# Patient Record
Sex: MALE | Employment: FULL TIME | URBAN - METROPOLITAN AREA
[De-identification: names, ages, dates, MRNs, and addresses within clinical notes are randomized per-mention and may not be internally consistent; named-entity substitution may affect disease eponyms.]

---

## 2024-09-25 ENCOUNTER — OFFICE VISIT (OUTPATIENT)
Dept: CARDIOLOGY | Facility: CLINIC | Age: 59
End: 2024-09-25
Payer: COMMERCIAL

## 2024-09-25 VITALS
HEIGHT: 70 IN | DIASTOLIC BLOOD PRESSURE: 92 MMHG | WEIGHT: 309 LBS | BODY MASS INDEX: 44.24 KG/M2 | HEART RATE: 97 BPM | SYSTOLIC BLOOD PRESSURE: 142 MMHG

## 2024-09-25 DIAGNOSIS — R06.02 SHORTNESS OF BREATH: ICD-10-CM

## 2024-09-25 DIAGNOSIS — I51.7 MILD CONCENTRIC LEFT VENTRICULAR HYPERTROPHY (LVH): ICD-10-CM

## 2024-09-25 DIAGNOSIS — Z76.89 ENCOUNTER TO ESTABLISH CARE WITH NEW DOCTOR: ICD-10-CM

## 2024-09-25 DIAGNOSIS — Z13.29 SCREENING FOR THYROID DISORDER: ICD-10-CM

## 2024-09-25 DIAGNOSIS — Z78.9 NEVER SMOKED TOBACCO: ICD-10-CM

## 2024-09-25 DIAGNOSIS — I10 BENIGN ESSENTIAL HTN: ICD-10-CM

## 2024-09-25 DIAGNOSIS — R60.0 BILATERAL LEG EDEMA: ICD-10-CM

## 2024-09-25 PROCEDURE — 1036F TOBACCO NON-USER: CPT | Performed by: INTERNAL MEDICINE

## 2024-09-25 PROCEDURE — 3080F DIAST BP >= 90 MM HG: CPT | Performed by: INTERNAL MEDICINE

## 2024-09-25 PROCEDURE — 3008F BODY MASS INDEX DOCD: CPT | Performed by: INTERNAL MEDICINE

## 2024-09-25 PROCEDURE — 3077F SYST BP >= 140 MM HG: CPT | Performed by: INTERNAL MEDICINE

## 2024-09-25 PROCEDURE — 99204 OFFICE O/P NEW MOD 45 MIN: CPT | Performed by: INTERNAL MEDICINE

## 2024-09-25 PROCEDURE — 93000 ELECTROCARDIOGRAM COMPLETE: CPT | Performed by: INTERNAL MEDICINE

## 2024-09-25 RX ORDER — FUROSEMIDE 20 MG/1
20 TABLET ORAL DAILY
COMMUNITY
End: 2024-09-25 | Stop reason: SDUPTHER

## 2024-09-25 RX ORDER — MONTELUKAST SODIUM 10 MG/1
10 TABLET ORAL NIGHTLY
COMMUNITY

## 2024-09-25 RX ORDER — VALSARTAN 40 MG/1
40 TABLET ORAL DAILY
COMMUNITY

## 2024-09-25 RX ORDER — METOPROLOL SUCCINATE 25 MG/1
12.5 TABLET, EXTENDED RELEASE ORAL DAILY
Qty: 45 TABLET | Refills: 3 | Status: SHIPPED | OUTPATIENT
Start: 2024-09-25 | End: 2025-09-25

## 2024-09-25 RX ORDER — FLUTICASONE PROPIONATE AND SALMETEROL XINAFOATE 45; 21 UG/1; UG/1
2 AEROSOL, METERED RESPIRATORY (INHALATION)
COMMUNITY

## 2024-09-25 RX ORDER — FUROSEMIDE 20 MG/1
TABLET ORAL
Qty: 135 TABLET | Refills: 3 | Status: SHIPPED | OUTPATIENT
Start: 2024-09-25

## 2024-09-25 ASSESSMENT — ENCOUNTER SYMPTOMS
RESPIRATORY NEGATIVE: 1
CONSTITUTIONAL NEGATIVE: 1
NEUROLOGICAL NEGATIVE: 1
CARDIOVASCULAR NEGATIVE: 1

## 2024-09-25 NOTE — PATIENT INSTRUCTIONS
CT Cor. Nicholas. Score  BMP,BNP, TSH  Start metoprolol S. 12.5 mg a day  Increase furosemide 20mg one day alternating with 40 mg the next  1-2 month visit    Patient educated on proper medication use.   Patient educated on risk factor modification.   Please bring any lab results from other providers / physicians to your next appointment.     Please bring all medicines, vitamins, and herbal supplements with you when you come to the office.     Prescriptions will not be filled unless you are compliant with your follow up appointments or have a follow up appointment scheduled as per instruction of your physician. Refills should be requested at the time of your visit.

## 2024-09-25 NOTE — PROGRESS NOTES
CARDIOLOGY OFFICE VISIT      CHIEF COMPLAINT  Chief Complaint   Patient presents with    New Patient Visit     Bilateral leg edema ,echo findings        HISTORY OF PRESENT ILLNESS  Laureano Elizabeth is a 59 y.o. year old male patient With morbid obesity and newly diagnosed hypertension was referred by primary care physician for lower extremity swelling.  [place] he [place] a lot of walking and he does not have chest pain or significant shortness of breath with his day-to-day activities.  However he has noticed progressive leg swelling and was seen by his primary care physician noted to be hypertensive and started on valsartan as well as hydrochlorothiazide which given some cramps this was switched to oral Lasix.  He has no prior cardiac history and there is no family history of premature coronary artery disease or sudden cardiac death.  He does not smoke and he is not exposed to secondhand smoking and he does not drink alcohol and he denies any recreational drug use.  He said he has been trying to lose weight and so far has lost about 30 pounds.  He had an echocardiogram on 9/17/2024 that shows normal size left ventricular cavity with normal LV function with EF of 64%.  There is mild left atrial dilatation and mild concentric LVH.  There are no gross valvular abnormalities.  EKG today shows normal sinus rhythm with borderline tachycardia and heart rate of 97 bpm.  Recent lipid profile showed total cholesterol is 152, HDL is 33, LDL is 102 and triglyceride is 86    ASSESSMENT AND PLAN:  1.  Benign essential hypertension: Blood pressure is suboptimally controlled patient is on low-dose valsartan as noted above which we will continue.  I will add low-dose Toprol-XL 12.5 mg daily in view of his resting tachycardia as well as to optimize his blood pressure control.  We will get a baseline BNP as well as a TSH.  2.  Lower extremity swelling: This is likely multifactorial with significant morbid  "obesity and possible diastolic dysfunction.  We have increased his Lasix to 40 mg to alternate with 20 mg every other day and we will repeat his BMP prior to his next follow-up.  3.  Morbid obesity: Patient is encouraged to lose weight and consider possible bariatric surgery if he is not successful.  Will get thyroid profile for further evaluation.    Problem List Items Addressed This Visit       Benign essential HTN    BMI 40.0-44.9, adult (Multi)    Never smoked tobacco    Bilateral leg edema    Mild concentric left ventricular hypertrophy (LVH)     Other Visit Diagnoses       Encounter to establish care with new doctor                Recent Cardiovascular Testing:    Echo-  Stress-  Cath-  Carotid Ultrasound-    Past Medical History  Past Medical History:   Diagnosis Date    HTN (hypertension)        Social History  Social History     Tobacco Use    Smoking status: Never    Smokeless tobacco: Never   Substance Use Topics    Alcohol use: Not Currently    Drug use: Never       Family History   No family history on file.     Allergies:  No Known Allergies     Outpatient Medications:  Current Outpatient Medications   Medication Instructions    fluticasone propion-salmeteroL (Advair HFA) 45-21 mcg/actuation inhaler 2 puffs, inhalation, 2 times daily RT, Rinse mouth with water after use to reduce aftertaste and incidence of candidiasis. Do not swallow.    furosemide (LASIX) 20 mg, oral, Daily    montelukast (SINGULAIR) 10 mg, oral, Nightly    valsartan (DIOVAN) 40 mg, oral, Daily        Recent Lab Results:    CBC:   No results found for: \"WBC\", \"RBC\", \"HGB\", \"HCT\", \"PLT\"     CMP:    No results found for: \"NA\", \"K\", \"CL\", \"CO2\", \"BUN\", \"CREATININE\", \"AGRATIO\", \"GLUCOSE\", \"GLU\", \"CALCIUM\"    Magnesium:    No results found for: \"MG\"    Lipid Profile:    No results found for: \"CHLPL\", \"TRIG\", \"HDL\", \"LDLCALC\", \"LDLDIRECT\"    TSH:    No results found for: \"TSH\"    BNP:   No results found for: \"BNP\"     PT/INR:    No results " "found for: \"PROTIME\", \"INR\"    HgBA1c:    Lab Results   Component Value Date    HGBA1C 5.8 (H) 07/26/2024       BMP:  No results found for: \"NA\", \"K\", \"CL\", \"CO2\", \"BUN\", \"CREATININE\", \"GLU\"    CBC:  No results found for: \"WBC\", \"RBC\", \"HGB\", \"HCT\", \"MCV\", \"MCH\", \"MCHC\", \"RDW\", \"PLT\", \"MPV\"    Cardiac Enzymes:    No results found for: \"TROPHS\"    Hepatic Function Panel:    No results found for: \"ALKPHOS\", \"ALT\", \"AST\", \"PROT\", \"BILITOT\", \"BILIDIR\"      REVIEW OF SYSTEMS  Review of Systems   Constitutional: Negative.   Cardiovascular: Negative.    Respiratory: Negative.     Neurological: Negative.    All other systems reviewed and are negative.      VITALS  Vitals:    09/25/24 0902   BP: 144/90   Pulse: 97     Wt Readings from Last 4 Encounters:   09/25/24 140 kg (309 lb)       PHYSICAL EXAM  Constitutional:       Appearance: Healthy appearance.   Eyes:      Pupils: Pupils are equal, round, and reactive to light.   Pulmonary:      Effort: Pulmonary effort is normal.      Breath sounds: Normal breath sounds.   Cardiovascular:      PMI at left midclavicular line. Normal rate. Regular rhythm.      Murmurs: There is no murmur.      No gallop.  No click. No rub.   Pulses:     Intact distal pulses.   Edema:     Comments: 2 + bilateral leg edema  Musculoskeletal: Normal range of motion.      Cervical back: Normal range of motion. Skin:     General: Skin is warm and dry.   Neurological:      General: No focal deficit present.      Mental Status: Alert and oriented to person, place and time.             "

## 2024-10-22 ENCOUNTER — APPOINTMENT (OUTPATIENT)
Dept: CARDIOLOGY | Facility: CLINIC | Age: 59
End: 2024-10-22
Payer: COMMERCIAL

## 2024-11-07 ENCOUNTER — APPOINTMENT (OUTPATIENT)
Dept: RADIOLOGY | Facility: CLINIC | Age: 59
End: 2024-11-07
Payer: COMMERCIAL

## 2024-11-20 ENCOUNTER — APPOINTMENT (OUTPATIENT)
Dept: CARDIOLOGY | Facility: CLINIC | Age: 59
End: 2024-11-20
Payer: COMMERCIAL

## 2025-04-01 ENCOUNTER — OFFICE VISIT (OUTPATIENT)
Dept: ORTHOPEDIC SURGERY | Facility: CLINIC | Age: 60
End: 2025-04-01
Payer: COMMERCIAL

## 2025-04-01 ENCOUNTER — HOSPITAL ENCOUNTER (OUTPATIENT)
Dept: RADIOLOGY | Facility: CLINIC | Age: 60
Discharge: HOME | End: 2025-04-01
Payer: COMMERCIAL

## 2025-04-01 ENCOUNTER — PREP FOR PROCEDURE (OUTPATIENT)
Dept: ORTHOPEDIC SURGERY | Facility: CLINIC | Age: 60
End: 2025-04-01

## 2025-04-01 VITALS — BODY MASS INDEX: 36.4 KG/M2 | HEIGHT: 71 IN | WEIGHT: 260 LBS

## 2025-04-01 DIAGNOSIS — M25.562 ACUTE PAIN OF LEFT KNEE: ICD-10-CM

## 2025-04-01 PROBLEM — M17.12 UNILATERAL PRIMARY OSTEOARTHRITIS, LEFT KNEE: Status: ACTIVE | Noted: 2025-04-01

## 2025-04-01 PROCEDURE — 3008F BODY MASS INDEX DOCD: CPT | Performed by: ORTHOPAEDIC SURGERY

## 2025-04-01 PROCEDURE — 73560 X-RAY EXAM OF KNEE 1 OR 2: CPT | Mod: RT

## 2025-04-01 PROCEDURE — 73564 X-RAY EXAM KNEE 4 OR MORE: CPT | Mod: RIGHT SIDE | Performed by: RADIOLOGY

## 2025-04-01 PROCEDURE — 99214 OFFICE O/P EST MOD 30 MIN: CPT | Mod: 57 | Performed by: ORTHOPAEDIC SURGERY

## 2025-04-01 PROCEDURE — 99204 OFFICE O/P NEW MOD 45 MIN: CPT | Performed by: ORTHOPAEDIC SURGERY

## 2025-04-01 PROCEDURE — 73564 X-RAY EXAM KNEE 4 OR MORE: CPT | Mod: LT

## 2025-04-01 PROCEDURE — 73560 X-RAY EXAM OF KNEE 1 OR 2: CPT | Mod: RIGHT SIDE | Performed by: RADIOLOGY

## 2025-04-02 DIAGNOSIS — M17.12 UNILATERAL PRIMARY OSTEOARTHRITIS, LEFT KNEE: ICD-10-CM

## 2025-05-01 ASSESSMENT — DUKE ACTIVITY SCORE INDEX (DASI)
TOTAL_SCORE: 36.7
CAN YOU DO MODERATE WORK AROUND THE HOUSE LIKE VACUUMING, SWEEPING FLOORS OR CARRYING GROCERIES: YES
CAN YOU DO YARD WORK LIKE RAKING LEAVES, WEEDING OR PUSHING A MOWER: YES
CAN YOU DO HEAVY WORK AROUND THE HOUSE LIKE SCRUBBING FLOORS OR LIFTING AND MOVING HEAVY FURNITURE: YES
CAN YOU TAKE CARE OF YOURSELF (EAT, DRESS, BATHE, OR USE TOILET): YES
CAN YOU CLIMB A FLIGHT OF STAIRS OR WALK UP A HILL: YES
CAN YOU RUN A SHORT DISTANCE: NO
CAN YOU WALK A BLOCK OR TWO ON LEVEL GROUND: YES
CAN YOU HAVE SEXUAL RELATIONS: YES
CAN YOU PARTICIPATE IN STRENOUS SPORTS LIKE SWIMMING, SINGLES TENNIS, FOOTBALL, BASKETBALL, OR SKIING: NO
CAN YOU PARTICIPATE IN MODERATE RECREATIONAL ACTIVITIES LIKE GOLF, BOWLING, DANCING, DOUBLES TENNIS OR THROWING A BASEBALL OR FOOTBALL: NO
CAN YOU WALK INDOORS, SUCH AS AROUND YOUR HOUSE: YES
DASI METS SCORE: 7.3
CAN YOU DO LIGHT WORK AROUND THE HOUSE LIKE DUSTING OR WASHING DISHES: YES

## 2025-05-01 ASSESSMENT — ACTIVITIES OF DAILY LIVING (ADL): ADL_SCORE: 1

## 2025-05-01 ASSESSMENT — LIFESTYLE VARIABLES: SMOKING_STATUS: NONSMOKER

## 2025-05-02 ENCOUNTER — LAB (OUTPATIENT)
Dept: LAB | Facility: HOSPITAL | Age: 60
End: 2025-05-02
Payer: COMMERCIAL

## 2025-05-02 ENCOUNTER — PRE-ADMISSION TESTING (OUTPATIENT)
Dept: PREADMISSION TESTING | Facility: HOSPITAL | Age: 60
End: 2025-05-02
Payer: COMMERCIAL

## 2025-05-02 ENCOUNTER — HOSPITAL ENCOUNTER (OUTPATIENT)
Dept: RADIOLOGY | Facility: HOSPITAL | Age: 60
Discharge: HOME | End: 2025-05-02
Payer: COMMERCIAL

## 2025-05-02 VITALS
TEMPERATURE: 96.8 F | RESPIRATION RATE: 16 BRPM | DIASTOLIC BLOOD PRESSURE: 78 MMHG | HEART RATE: 92 BPM | HEIGHT: 70 IN | WEIGHT: 270.06 LBS | OXYGEN SATURATION: 97 % | SYSTOLIC BLOOD PRESSURE: 120 MMHG | BODY MASS INDEX: 38.66 KG/M2

## 2025-05-02 DIAGNOSIS — Z01.818 PREOP EXAMINATION: ICD-10-CM

## 2025-05-02 DIAGNOSIS — M17.12 UNILATERAL PRIMARY OSTEOARTHRITIS, LEFT KNEE: Primary | ICD-10-CM

## 2025-05-02 DIAGNOSIS — M25.562 ACUTE PAIN OF LEFT KNEE: ICD-10-CM

## 2025-05-02 DIAGNOSIS — M17.12 UNILATERAL PRIMARY OSTEOARTHRITIS, LEFT KNEE: ICD-10-CM

## 2025-05-02 LAB
ALBUMIN SERPL BCP-MCNC: 3.5 G/DL (ref 3.4–5)
ALP SERPL-CCNC: 54 U/L (ref 33–136)
ALT SERPL W P-5'-P-CCNC: 7 U/L (ref 10–52)
ANION GAP SERPL CALC-SCNC: 12 MMOL/L (ref 10–20)
APTT PPP: 29 SECONDS (ref 26–36)
AST SERPL W P-5'-P-CCNC: 9 U/L (ref 9–39)
BASOPHILS # BLD AUTO: 0.03 X10*3/UL (ref 0–0.1)
BASOPHILS NFR BLD AUTO: 0.4 %
BILIRUB SERPL-MCNC: 0.2 MG/DL (ref 0–1.2)
BUN SERPL-MCNC: 15 MG/DL (ref 6–23)
CALCIUM SERPL-MCNC: 9 MG/DL (ref 8.6–10.3)
CHLORIDE SERPL-SCNC: 101 MMOL/L (ref 98–107)
CO2 SERPL-SCNC: 28 MMOL/L (ref 21–32)
CREAT SERPL-MCNC: 0.88 MG/DL (ref 0.5–1.3)
EGFRCR SERPLBLD CKD-EPI 2021: >90 ML/MIN/1.73M*2
EOSINOPHIL # BLD AUTO: 0.71 X10*3/UL (ref 0–0.7)
EOSINOPHIL NFR BLD AUTO: 9 %
ERYTHROCYTE [DISTWIDTH] IN BLOOD BY AUTOMATED COUNT: 16.2 % (ref 11.5–14.5)
EST. AVERAGE GLUCOSE BLD GHB EST-MCNC: 91 MG/DL
GLUCOSE SERPL-MCNC: 91 MG/DL (ref 74–99)
HBA1C MFR BLD: 4.8 % (ref ?–5.7)
HCT VFR BLD AUTO: 35.3 % (ref 41–52)
HGB BLD-MCNC: 10.6 G/DL (ref 13.5–17.5)
HOLD SPECIMEN: 293
IMM GRANULOCYTES # BLD AUTO: 0.04 X10*3/UL (ref 0–0.7)
IMM GRANULOCYTES NFR BLD AUTO: 0.5 % (ref 0–0.9)
INR PPP: 1.2 (ref 0.9–1.1)
LYMPHOCYTES # BLD AUTO: 1.33 X10*3/UL (ref 1.2–4.8)
LYMPHOCYTES NFR BLD AUTO: 16.9 %
MCH RBC QN AUTO: 26 PG (ref 26–34)
MCHC RBC AUTO-ENTMCNC: 30 G/DL (ref 32–36)
MCV RBC AUTO: 87 FL (ref 80–100)
MONOCYTES # BLD AUTO: 0.65 X10*3/UL (ref 0.1–1)
MONOCYTES NFR BLD AUTO: 8.2 %
NEUTROPHILS # BLD AUTO: 5.12 X10*3/UL (ref 1.2–7.7)
NEUTROPHILS NFR BLD AUTO: 65 %
NRBC BLD-RTO: 0 /100 WBCS (ref 0–0)
PLATELET # BLD AUTO: 307 X10*3/UL (ref 150–450)
POTASSIUM SERPL-SCNC: 4.9 MMOL/L (ref 3.5–5.3)
PROT SERPL-MCNC: 6.9 G/DL (ref 6.4–8.2)
PROTHROMBIN TIME: 13.8 SECONDS (ref 9.8–12.4)
RBC # BLD AUTO: 4.08 X10*6/UL (ref 4.5–5.9)
SODIUM SERPL-SCNC: 136 MMOL/L (ref 136–145)
WBC # BLD AUTO: 7.9 X10*3/UL (ref 4.4–11.3)

## 2025-05-02 PROCEDURE — 85025 COMPLETE CBC W/AUTO DIFF WBC: CPT

## 2025-05-02 PROCEDURE — 73700 CT LOWER EXTREMITY W/O DYE: CPT | Mod: LT

## 2025-05-02 PROCEDURE — 83036 HEMOGLOBIN GLYCOSYLATED A1C: CPT

## 2025-05-02 PROCEDURE — 85610 PROTHROMBIN TIME: CPT

## 2025-05-02 PROCEDURE — 80053 COMPREHEN METABOLIC PANEL: CPT

## 2025-05-02 PROCEDURE — 87081 CULTURE SCREEN ONLY: CPT | Mod: STJLAB | Performed by: ORTHOPAEDIC SURGERY

## 2025-05-02 PROCEDURE — 85730 THROMBOPLASTIN TIME PARTIAL: CPT

## 2025-05-02 PROCEDURE — 99202 OFFICE O/P NEW SF 15 MIN: CPT

## 2025-05-02 RX ORDER — MELOXICAM 15 MG/1
15 TABLET ORAL DAILY
COMMUNITY

## 2025-05-02 RX ORDER — CHLORHEXIDINE GLUCONATE ORAL RINSE 1.2 MG/ML
SOLUTION DENTAL
Qty: 473 ML | Refills: 0 | Status: SHIPPED | OUTPATIENT
Start: 2025-05-02

## 2025-05-02 RX ORDER — VALSARTAN 80 MG/1
80 TABLET ORAL DAILY
COMMUNITY

## 2025-05-02 RX ORDER — NEBIVOLOL 5 MG/1
5 TABLET ORAL DAILY
COMMUNITY

## 2025-05-02 NOTE — PREPROCEDURE INSTRUCTIONS
Medication List            Accurate as of May 2, 2025 10:38 AM. Always use your most recent med list.                chlorhexidine 0.12 % solution  Commonly known as: Peridex  15 milliliter(s) orally once a day for 2 doses 15 ml  the night before surgery and 15 ml morning of surgery - swish for 30 seconds -DO NOT SWALLOW, SPIT OUT     furosemide 20 mg tablet  Commonly known as: Lasix  Take two tabs. One day alternating with one tab. The next  Medication Adjustments for Surgery: Do Not take on the morning of surgery     meloxicam 15 mg tablet  Commonly known as: Mobic  Additional Medication Adjustments for Surgery: Take last dose 7 days before surgery     metoprolol succinate XL 25 mg 24 hr tablet  Commonly known as: Toprol-XL  Take 0.5 tablets (12.5 mg) by mouth once daily. Do not crush or chew.  Additional Medication Adjustments for Surgery: Other (Comment)  Notes to patient: Not taking      montelukast 10 mg tablet  Commonly known as: Singulair  Medication Adjustments for Surgery: Take/Use as prescribed     nebivolol 5 mg tablet  Commonly known as: Bystolic  Medication Adjustments for Surgery: Take/Use as prescribed     valsartan 80 mg tablet  Commonly known as: Diovan  Additional Medication Adjustments for Surgery: Other (Comment)  Notes to patient: HOLD any evening dose the night before the day of surgery  HOLD the day of surgery                                  PRE-OPERATIVE INSTRUCTIONS    You will receive notification one business day prior to your procedure to confirm your arrival time. It is important that you answer your phone and/or check your messages during this time. If you do not hear from the surgery center by 5 pm. the day before your procedure, please call [phone].     Please enter the building through the Outpatient entrance and take the elevator off the lobby to the 2nd floor then check in at the Outpatient Surgery desk on the 2nd floor.    INSTRUCTIONS:  Talk to your surgeon for  instructions if you should stop your aspirin, blood thinner, or diabetes medicines.  DO NOT take any multivitamins or over the counter supplements for 7-10 days before surgery.  If not being admitted, you must have an adult immediately available to drive you home after surgery. We also highly recommend you have someone stay with you for the entire day and night of your surgery.  For children having surgery, a parent or legal guardian must accompany them to the surgery center. If this is not possible, please call [phone] to make additional arrangements.  For adults who are unable to consent or make medical decisions for themselves, a legal guardian or Power of  must accompany them to the surgery center. If this is not possible, please call [phone] to make additional arrangements.  Wear comfortable, loose fitting clothing.  All jewelry and piercings must be removed. If you are unable to remove an item or have a dermal piercing, please be sure to tell the nurse when you arrive for surgery.  Nail polish and make-up must be removed.  Avoid smoking or consuming alcohol for 24 hours before surgery.  To help prevent infection, please take a shower/bath and wash your hair the night before and/or morning of surgery (or follow other specific bathing instructions provided).    Preoperative Fasting Guidelines    Why must I stop eating and drinking near surgery time?  With sedation, food or liquid in your stomach can enter your lungs causing serious complications  Increases nausea and vomiting    When do I need to stop eating and drinking before my surgery?  Do not eat any solid food after midnight the night before your surgery/procedure unless otherwise instructed by your surgeon.   If you take a GLP-1 medication (ex: Trulicity, Ozempic, Mounjaro, Zepbound, Bydyreon, Tanzeun, Saxenda, Victoza, Adlyxin, Rybelus) please follow other specific instructions provided regarding preoperative          fasting.  You may  have up to 13.5 ounces of clear liquid until TWO hours before your instructed arrival time to the hospital.  This includes water, black tea/coffee, (no milk or cream) apple juice, and electrolyte drinks (Gatorade).   You may chew gum until TWO hours before your surgery/procedure         If applicable, notify your surgeons office immediately of any new skin changes that occur to the surgical limb.      If you have any questions or concerns, please call Pre-Admission Testing at [phone].

## 2025-05-02 NOTE — CPM/PAT H&P
CPM/PAT Evaluation       Name: Laureano Elizabeth (Laureano Min  /Age: 1965/59 y.o.     In-Person       Chief Complaint: Left knee pain     HPI  Pleasant 60 y/o male presents with unilateral primary osteoarthritis, left knee scheduled for left knee arthroplasty on 25. States he has been having pain for at least one year. Endorses limited range of motion and limited physical activity. Endorses significant swelling in lower extremities for past 6-7 months. Denies recent fever/illness/chills.    Medical History[1]    Surgical History[2]    Patient Sexual activity questions deferred to the physician.    Family History[3]    Allergies[4]    Prior to Admission medications    Medication Sig Start Date End Date Taking? Authorizing Provider   fluticasone propion-salmeteroL (Advair HFA) 45-21 mcg/actuation inhaler Inhale 2 puffs 2 times a day. Rinse mouth with water after use to reduce aftertaste and incidence of candidiasis. Do not swallow.    Historical Provider, MD   furosemide (Lasix) 20 mg tablet Take two tabs. One day alternating with one tab. The next 24   Pako Cao MD   metoprolol succinate XL (Toprol-XL) 25 mg 24 hr tablet Take 0.5 tablets (12.5 mg) by mouth once daily. Do not crush or chew. 24  Pako Cao MD   montelukast (Singulair) 10 mg tablet Take 1 tablet (10 mg) by mouth once daily at bedtime.    Historical Provider, MD   valsartan (Diovan) 40 mg tablet Take 1 tablet (40 mg) by mouth once daily.    Historical Provider, MD        Constitutional: Negative for fever, chills, or sweats   ENMT: Negative for nasal discharge, congestion, ear pain, mouth pain, throat pain. Positive for contacts/glasses. Positive for sinus congestion related to allergies.   Respiratory: Negative for cough, wheezing, shortness of breath   Cardiac: Negative for chest pain, dyspnea on exertion, palpitations   Gastrointestinal: Negative for nausea, vomiting, diarrhea, constipation, abdominal  pain  Genitourinary: Negative for dysuria, flank pain, frequency, hematuria   Musculoskeletal: Negative for decreased ROM, pain, swelling, weakness. See HPI. Positive for bilat. LE and hand swelling.   Neurological: Negative for dizziness, confusion, headache  Psychiatric: Negative for mood changes   Skin: Negative for itching, rash, ulcer    Hematologic/Lymph: Negative for bruising, easy bleeding  Allergic/Immunologic: Negative itching, sneezing, swelling      Physical Exam  Vitals reviewed.   Constitutional:       Appearance: Normal appearance. He is obese.   HENT:      Head: Normocephalic.      Mouth/Throat:      Mouth: Mucous membranes are moist.      Pharynx: Oropharynx is clear.   Eyes:      Pupils: Pupils are equal, round, and reactive to light.   Cardiovascular:      Rate and Rhythm: Normal rate and regular rhythm.      Heart sounds: Normal heart sounds.   Pulmonary:      Effort: Pulmonary effort is normal.      Breath sounds: Normal breath sounds.   Abdominal:      General: Bowel sounds are normal.      Palpations: Abdomen is soft.   Musculoskeletal:         General: Normal range of motion.      Cervical back: Normal range of motion.      Comments: Bilateral hand/wrist and bilateral lower extremity-+2 pitting edema    Skin:     General: Skin is warm and dry.   Neurological:      General: No focal deficit present.      Mental Status: He is alert and oriented to person, place, and time.   Psychiatric:         Mood and Affect: Mood normal.         Behavior: Behavior normal.          PAT AIRWAY:   Airway:     Mallampati::  IV    Neck ROM::  Limited  normal        Testing/Diagnostic:   Nuclear stress on file from 4/25/25. Stress ECG Conclusion:   Conclusion: Normal     Stress ECG Summary:   The patient's resting heart rate was 80 bpm and blood pressure was 118/67   mmHg. The patient received regadenoson 0.4 mg IVP over approximately 15   seconds followed immediately by injection of nuclear isotope. The test    was terminated due to end of protocol. Other symptoms during the test   included lightheadedness. The maximum heart rate was 93 bpm, which is 58%   of the predicted heart rate for age. Peak blood pressure was 104/64 mmHg.   The double product achieved was 9672.     PVR with exercise 8/27/2024  IMPRESSION     RIGHT SIDE      Resting right ankle brachial index: 1.31   Post exercise right ankle brachial index: 1.38      Normal ankle brachial index at rest in the right leg.      Right ankle: Normal at rest.      No significant change in pressure and/or ankle-brachial index with exercise.      LEFT SIDE      Resting left ankle brachial index: 1.26   Post exercise left ankle brachial index: 1.24      Normal ankle brachial index at rest in the left leg.      Left ankle: Normal at rest.      No significant change in pressure and/or ankle-brachial index with exercise.         Ultrasound DVT 8/26/2024  IMPRESSION   RIGHT SIDE - DEEP VEINS   Negative for acute deep vein thrombosis.      RIGHT SIDE - SUPERFICIAL VEINS   Negative for superficial thrombophlebitis in the great saphenous vein and small   saphenous vein.      LEFT SIDE - DEEP VEINS   Negative for acute deep vein thrombosis.      LEFT SIDE - SUPERFICIAL VEINS   Negative for superficial thrombophlebitis in the great saphenous vein and small   saphenous vein.     Echocardiogram 9/17/2024  CONCLUSIONS:   - Technically difficult exam due to body habitus and suboptimal positioning.   - Exam indication: Re-evaluation of Hypertensive heart disease without change in   clinical status   - The left ventricle is normal in size. There is mild left ventricular   hypertrophy. Left ventricular systolic function is normal. EF = 64 ± 5% (2D   biplane) Indeterminate left ventricular diastolic function.   - The right ventricle is normal in size. Right ventricular systolic function is   normal.   - The left atrial cavity is mildly dilated.   - There are no significant valvular  "abnormalities.   - Exam was compared with the prior  echocardiographic exam performed on   06/23/2022 ([place]). No significant change    Patient Specialist/PCP:   PCP: Dr. Mendez  Cardiology: Dr. Cerna     Visit Vitals  /78   Pulse 92   Temp 36 °C (96.8 °F) (Temporal)   Resp 16   Ht 1.778 m (5' 10\")   Wt 123 kg (270 lb 1 oz)   SpO2 97%   BMI 38.75 kg/m²   Smoking Status Never   BSA 2.46 m²       DASI Risk Score      Flowsheet Row Pre-Admission Testing from 5/2/2025 in [facility]   Can you take care of yourself (eat, dress, bathe, or use toilet)?  2.75 filed at 05/01/2025 1059   Can you walk indoors, such as around your house? 1.75 filed at 05/01/2025 1059   Can you walk a block or two on level ground?  2.75 filed at 05/01/2025 1059   Can you climb a flight of stairs or walk up a hill? 5.5 filed at 05/01/2025 1059   Can you run a short distance? 0 filed at 05/01/2025 1059   Can you do light work around the house like dusting or washing dishes? 2.7 filed at 05/01/2025 1059   Can you do moderate work around the house like vacuuming, sweeping floors or carrying groceries? 3.5 filed at 05/01/2025 1059   Can you do heavy work around the house like scrubbing floors or lifting and moving heavy furniture?  8 filed at 05/01/2025 1059   Can you do yard work like raking leaves, weeding or pushing a mower? 4.5 filed at 05/01/2025 1059   Can you have sexual relations? 5.25 filed at 05/01/2025 1059   Can you participate in moderate recreational activities like golf, bowling, dancing, doubles tennis or throwing a baseball or football? 0 filed at 05/01/2025 1059   Can you participate in strenous sports like swimming, singles tennis, football, basketball, or skiing? 0 filed at 05/01/2025 1059   DASI SCORE 36.7 filed at 05/01/2025 1059   METS Score (Will be calculated only when all the questions are answered) 7.3 filed at 05/01/2025 1059          Caprini DVT Assessment      Flowsheet Row Pre-Admission " Testing from 5/2/2025 in [facility]   DVT Score (IF A SCORE IS NOT CALCULATING, MUST SELECT A BMI TO COMPLETE) 10 filed at 05/01/2025 1100   Surgical Factors Elective major lower extremity arthroplasty filed at 05/01/2025 1100   BMI (BMI MUST BE CHOSEN) 31-40 (Obesity) filed at 05/01/2025 1100          Modified Frailty Index      Flowsheet Row Pre-Admission Testing from 5/2/2025 in [facility]   Non-independent functional status (problems with dressing, bathing, personal grooming, or cooking) 0 filed at 05/01/2025 1059   History of diabetes mellitus  0 filed at 05/01/2025 1059   History of COPD 0 filed at 05/01/2025 1059   History of CHF No filed at 05/01/2025 1059   History of MI 0 filed at 05/01/2025 1059   History of Percutaneous Coronary Intervention, Cardiac Surgery, or Angina No filed at 05/01/2025 1059   Hypertension requiring the use of medication  0.0909 filed at 05/01/2025 1059   Peripheral vascular disease 0 filed at 05/01/2025 1059   Impaired sensorium (cognitive impairement or loss, clouding, or delirium) 0 filed at 05/01/2025 1059   TIA or CVA withouy residual deficit 0 filed at 05/01/2025 1059   Cerebrovascular accident with deficit 0 filed at 05/01/2025 1059   Modified Frailty Index Calculator .0909 filed at 05/01/2025 1059          [id number] Stroke Risk Points  Current as of just now        N/A 0 to 9 Points:      Last Change: N/A          The [id number] risk score (Lip WOO, et al. 2009. © 2010 [employer]) quantifies the risk of stroke for a patient with atrial fibrillation. For patients without atrial fibrillation or under the age of 18 this score appears as N/A. Higher score values generally indicate higher risk of stroke.        This score is not applicable to this patient. Components are not calculated.          Revised Cardiac Risk Index      Flowsheet Row Pre-Admission Testing from 5/2/2025 in [facility]   High-Risk  Surgery (Intraperitoneal, Intrathoracic,Suprainguinal vascular) 0 filed at 05/01/2025 1059   History of ischemic heart disease (History of MI, History of positive exercuse test, Current chest paint considered due to myocardial ischemia, Use of nitrate therapy, ECG with pathological Q Waves) 0 filed at 05/01/2025 1059   History of congestive heart failure (pulmonary edemia, bilateral rales or S3 gallop, Paroxysmal nocturnal dyspnea, CXR showing pulmonary vascular redistribution) 0 filed at 05/01/2025 1059   History of cerebrovascular disease (Prior TIA or stroke) 0 filed at 05/01/2025 1059   Pre-operative insulin treatment 0 filed at 05/01/2025 1059   Pre-operative creatinine>2 mg/dl 0 filed at 05/01/2025 1059   Revised Cardiac Risk Calculator 0 filed at 05/01/2025 1059          Apfel Simplified Score      Flowsheet Row Pre-Admission Testing from 5/2/2025 in [facility]   Smoking status 1 filed at 05/01/2025 1059   History of motion sickness or PONV  1 filed at 05/01/2025 1059   Use of postoperative opioids 1 filed at 05/01/2025 1059   Gender - Female 0=No filed at 05/01/2025 1059   Apfel Simplified Score Calculator 3 filed at 05/01/2025 1059          Risk Analysis Index Results This Encounter         5/1/2025  1100             Do you live in a place other than your own home?: 0    When did you begin living in the place you are currently residing?: Greater than one year ago    Any kidney failure, kidney not working well, or seeing a kidney doctor (nephrologist)? If yes, was this for kidney stones or another problem?: 0 No    Any history of chronic (long-term) congestive heart failure (CHF)?: 0 No    Any shortness of breath when resting?: 0 No    In the past five years, have you been diagnosed with or treated for cancer?: No    During the last 3 months has it become difficult for you to remember things or organize your thoughts?: 0 No    Have you lost weight of 10 pounds or more in the past 3 months  without trying?: 0 No    Do you have any loss of appetitie?: 0 No    Getting Around (Mobility): 1 Needs help from cane, walker, or scooter    Eatin Can plan and prepare own meals    Toiletin Can use toilet without any help    Personal Hygiene (Bathing, Hand Washing, Changing Clothes): 0 Can shower or bathe without any help    CONSTANTINO Cancer History: Patient does not indicate history of cancer    Total Risk Analysis Index Score Without Cancer: 22    Total Risk Analysis Index Score: 22          Stop Bang Score      Flowsheet Row Pre-Admission Testing from 2025 in [facility]   Do you snore loudly? 1 filed at 2025 1058   Do you often feel tired or fatigued after your sleep? 0 filed at 2025 1058   Has anyone ever observed you stop breathing in your sleep? 0 filed at 2025 1058   Do you have or are you being treated for high blood pressure? 1 filed at 2025 1058   Recent BMI (Calculated) 36.3 filed at 2025 1058   Is BMI greater than 35 kg/m2? 1=Yes filed at 2025 1058   Age older than 50 years old? 1=Yes filed at 2025 1058   Is your neck circumference greater than 17 inches (Male) or 16 inches (Female)? 0 filed at 2025 1058   Gender - Male 1=Yes filed at 2025 1058   STOP-BANG Total Score 5 filed at 2025 1058          Prodigy: High Risk  Total Score: 16              Prodigy Age Score      Prodigy Gender Score          ARISCAT Score for Postoperative Pulmonary Complications      Flowsheet Row Pre-Admission Testing from 2025 in [facility]   Age Calculated Score 3 filed at 2025 1101   Preoperative SpO2 0 filed at 2025 1101   Respiratory infection in the last month Either upper or lower (i.e., URI, bronchitis, pneumonia), with fever and antibiotic treatment 0 filed at 2025 1101   Preoperative anemia (Hgb less than 10 g/dl) 0 filed at 2025 1101   Surgical incision  0 filed at 2025 1101   Duration of  surgery  0 filed at 05/01/2025 1101   Emergency Procedure  0 filed at 05/01/2025 1101   ARISCAT Total Score  3 filed at 05/01/2025 1101          Jayda Perioperative Risk for Myocardial Infarction or Cardiac Arrest (ADAM)      Flowsheet Row Pre-Admission Testing from 5/2/2025 in [facility]   Calculated Age Score 1.2 filed at 05/01/2025 1101   Functional Status  0 filed at 05/01/2025 1101   ASA Class  -5.17 filed at 05/01/2025 1101   Creatinine 0 filed at 05/01/2025 1101   Type of Procedure  0.80 filed at 05/01/2025 1101   ADAM Total Score  -8.42 filed at 05/01/2025 1101   ADAM % 0.02 filed at 05/01/2025 1101            Assessment and Plan:     Assessment and Plan:     Preop:   OR with Dr. Leiva on 5/16/25   Labs ordered per Dr. Leiva.   EKG on file from 4/15/25. NSR. Incomplete RBBB.     Updated Dr. Leiva of patient's continued lymphedema prior to surgery-wanted him to be aware that the patient was attempting to follow with some of the treatment options given to him by vascular and OT for lymphedema but continues to have +2 pitting edema. He has not been able to tolerate wearing the velcro pieces that OT recommended to him. He also has been off lasix for some time-unsure who prescribed and if they wanted him to continue taking.     Neurologic:   The patient is at an increased risk for post operative delirium secondary to decrease functional status, polypharmacy , and type and duration of surgery . Preoperative brain exercise educational handout provided to patient.  The patient is at an increased risk for perioperative stroke secondary to HTN and general anesthesia.    Cardiac:  Hypertension: Controlled with medical management   Bilat LE edema: States he has had this for roughly 6-7 months. Cardiology note from 4/15 states he has no LE edema-unsure why as patient states he told cardiology about edema. He did see vascular in January of 2025 and he was referred for lymphedema therapy-he states he  followed for a few visits but has not been seen in months. He was also recommended to be tested for sleep apnea which he did not do.He no longer takes lasix as he said he had significant cramping and could not tolerate lasix. He has not taken for months. No open areas noted and skin is normal in color.   Duke Activity Status Index (DASI)  DASI Score: 36.7   MET Score: 7.3  RCRI  0 which is 3.9% 30 day risk of MACE (risk for cardiac death, nonfatal myocardial infarction, and nonfactal cardiac arrest)  ADAM score which indicates a  0.02 % risk of intraoperative or 30-day postoperative MACE    Pulmonary:   Asthma: Stable. Does not use inhalers-states it is more allergies than asthma. Takes singulair daily.   STOP-BANG score of  6. High  risk of obstructive sleep apnea.   ARISCAT:   3   points which is a low (1.6%) risk of in-hospital post-op pulmonary complications     Endocrine:   Obesity: Was seen by endocrinology who recommended diet changes and exercise. He has lost 6 pounds since Feb. 2025.     GI:  Apfel: 3 points 61% risk for post operative N/V    Neuro-muscular:   Arthritis: Reason for upcoming procedure     Hematologic:   Caprini score 10, patient at high risk for perioperative DVT. Patient provided with VTE education/handout.     Skin check: Patient was instructed to make surgeon aware of any skin changes/concerns prior to surgery.     Anesthesia: No history of anesthesia complications. No anesthesia concerns.      *See risk scores as previously documented        [1]   Past Medical History:  Diagnosis Date    Asthma     HTN (hypertension)    [2]   Past Surgical History:  Procedure Laterality Date    APPENDECTOMY      in tenth grade    OTHER SURGICAL HISTORY      UPPER ENDOSCOPY TO REMOVE LODGED FOOD IN ESOPHAGUS   [3] No family history on file.  [4] No Known Allergies

## 2025-05-02 NOTE — PREPROCEDURE INSTRUCTIONS
Thank you for visiting Preadmission Testing at [facility]. If you have any changes to your health condition, please call the SURGEON's office to alert them and give them details of your symptoms.  STOP all NSAIDS (Ibuprofen, Motrin, Aleve), vitamins, herbals, supplements, and all over the counter medications for 7 days prior to surgery  Tylenol is okay to take up until the morning of surgery for pain or headache if needed         Preoperative Brain Exercises    What are brain exercises?  A brain exercise is any activity that engages your thinking (cognitive) skills.    What types of activities are considered brain exercises?  Jigsaw puzzles, crossword puzzles, word jumble, memory games, word search, and many more.  Many can be found free online or on your phone via a mobile rain.    Why should I do brain exercises before my surgery?  More recent research has shown brain exercise before surgery can lower the risk of postoperative delirium (confusion) which can be especially important for older adults.  Patients who did brain exercises for 5 to 10 hours the days before surgery, cut their risk of postoperative delirium in half up to 1 week after surgery.      Preoperative Deep Breathing Exercises    Why it is important to do deep breathing exercises before my surgery?  Deep breathing exercises strengthen your breathing muscles.  This helps you to recover after your surgery and decreases the chance of breathing complications.    How are the deep breathing exercises done?  Sit straight with your back supported.  Breathe in deeply and slowly through your nose. Your lower rib cage should expand and your abdomen may move forward.  Hold that breath for 3 to 5 seconds.  Breathe out through pursed lips, slowly and completely.  Rest and repeat 10 times every hour while awake.  Rest longer if you become dizzy or lightheaded.      Patient and Family Education   Ways You Can Help Prevent Blood Clots     This handout explains some simple  things you can do to help prevent blood clots.      Blood clots are blockages that can form in the body's veins. When a blood clot forms in your deep veins, it may be called a deep vein thrombosis, or DVT for short. Blood clots can happen in any part of the body where blood flows, but they are most common in the arms and legs. If a piece of a blood clot breaks free and travels to the lungs, it is called a pulmonary embolus (PE). A PE can be a very serious problem.      Being in the hospital or having surgery can raise your chances of getting a blood clot because you may not be well enough to move around as much as you normally do.      Ways you can help prevent blood clots in the hospital         Wearing SCDs. SCDs stands for Sequential Compression Devices.   SCDs are special sleeves that wrap around your legs  They attach to a pump that fills them with air to gently squeeze your legs every few minutes.   This helps return the blood in your legs to your heart.   SCDs should only be taken off when walking or bathing.   SCDs may not be comfortable, but they can help save your life.               Wearing compression stockings - if your doctor orders them. These special snug fitting stockings gently squeeze your legs to help blood flow.       Walking. Walking helps move the blood in your legs.   If your doctor says it is ok, try walking the halls at least   5 times a day. Ask us to help you get up, so you don't fall.      Taking any blood thinning medicines your doctor orders.          ©[facility]; 3/23        Ways you can help prevent blood clots at home       Wearing compression stockings - if your doctor orders them. ? Walking - to help move the blood in your legs.       Taking any blood thinning medicines your doctor orders.      Signs of a blood clot or PE      Tell your doctor or nurse know right away if you have of the problems listed below.    If you are at home, seek medical care right away. Call [phone]  for chest pain or problems breathing.          Signs of a blood clot (DVT) - such as pain,  swelling, redness or warmth in your arm or leg      Signs of a pulmonary embolism (PE) - such as chest     pain or feeling short of breath

## 2025-05-04 LAB — STAPHYLOCOCCUS SPEC CULT: ABNORMAL

## 2025-05-06 ENCOUNTER — TELEPHONE (OUTPATIENT)
Dept: ORTHOPEDIC SURGERY | Facility: CLINIC | Age: 60
End: 2025-05-06
Payer: COMMERCIAL

## 2025-05-06 NOTE — TELEPHONE ENCOUNTER
LVM to PT about needing a walker after a total knee. Left bracing dept. # [id number] with instructions to confirm having a walker or needing to make appt. to receive one

## 2025-05-08 RX ORDER — TRANEXAMIC ACID 650 MG/1
1950 TABLET ORAL ONCE
OUTPATIENT
Start: 2025-05-08 | End: 2025-05-08

## 2025-05-08 RX ORDER — CELECOXIB 50 MG/1
200 CAPSULE ORAL ONCE
OUTPATIENT
Start: 2025-05-08 | End: 2025-05-08

## 2025-05-08 RX ORDER — ACETAMINOPHEN 325 MG/1
650 TABLET ORAL ONCE
OUTPATIENT
Start: 2025-05-08 | End: 2025-05-08

## 2025-05-08 RX ORDER — CEFAZOLIN SODIUM 2 G/100ML
2 INJECTION, SOLUTION INTRAVENOUS ONCE
OUTPATIENT
Start: 2025-05-08 | End: 2025-05-08

## 2025-05-08 NOTE — H&P
History Of Present Illness  Laureano Elizabeth is a 59 y.o. male presenting with left knee paina nd DJD.     Past Medical History  He has a past medical history of Asthma and HTN (hypertension).    Surgical History  He has a past surgical history that includes Appendectomy and Other surgical history.     Social History  He reports that he has never smoked. He has never used smokeless tobacco. He reports that he does not currently use alcohol. He reports that he does not use drugs.    Family History  Family History[1]     Allergies  Patient has no known allergies.    Review of Systems  CONSTITUTIONAL: Denies weight loss, fever and chills.    - HEENT: Denies changes in vision and hearing.    - RESPIRATORY: Denies SOB and cough.    - CV: Denies palpitations and CP.    - GI: Denies abdominal pain, nausea, vomiting and diarrhea.    - : Denies dysuria and urinary frequency.    - MSK: See physical exam findings     - SKIN: Denies rash and pruritus.    - NEUROLOGICAL: Denies headache and syncope.    - PSYCHIATRIC: Denies recent changes in mood. Denies anxiety and depression.      Physical Exam - GENERAL: Alert and oriented x 3. No acute distress. Well-nourished.    - EYES: EOMI. Anicteric.    - HENT: Moist mucous membranes. No scleral icterus. No cervical lymphadenopathy.    - LUNGS: Clear to auscultation bilaterally. No accessory muscle use.    - CARDIOVASCULAR: Regular rate and rhythm. No murmur. No JVD.    - ABDOMEN: Soft, non-tender and non-distended. No palpable masses.    - EXTREMITIES: pain with ROM     - NEUROLOGIC: No focal neurological deficits. CN II-XII grossly intact, but not individually tested.    - PSYCHIATRIC: Cooperative. Appropriate mood and affect.      Last Recorded Vitals  There were no vitals taken for this visit.    Relevant Results      Scheduled medications  Scheduled Medications[2]  Continuous medications  Continuous Medications[3]  PRN medications  PRN Medications[4]  No results found for this or  any previous visit (from the past 24 hours).    Assessment/Plan   Assessment & Plan  Unilateral primary osteoarthritis, left knee      MORAIMA TKA left       I spent 10 minutes in the professional and overall care of this patient.      Tuan Galvan PA-C         [1] No family history on file.  [2] [3] [4]

## 2025-05-09 ENCOUNTER — TELEPHONE (OUTPATIENT)
Dept: CARDIOLOGY | Facility: CLINIC | Age: 60
End: 2025-05-09
Payer: COMMERCIAL

## 2025-05-09 NOTE — TELEPHONE ENCOUNTER
Fax received from [facility]  asking for cardiac clearance for upcoming procedure. Reviewed by Dr Cheung in office and signed granting acceptable cardiac clearance. Faxed to [phone].

## 2025-05-15 ENCOUNTER — ANESTHESIA EVENT (OUTPATIENT)
Dept: OPERATING ROOM | Facility: HOSPITAL | Age: 60
End: 2025-05-15
Payer: COMMERCIAL

## 2025-05-16 ENCOUNTER — ANESTHESIA (OUTPATIENT)
Dept: OPERATING ROOM | Facility: HOSPITAL | Age: 60
End: 2025-05-16
Payer: COMMERCIAL

## 2025-05-16 ENCOUNTER — HOSPITAL ENCOUNTER (OUTPATIENT)
Facility: HOSPITAL | Age: 60
Discharge: HOME | End: 2025-05-18
Attending: ORTHOPAEDIC SURGERY | Admitting: ORTHOPAEDIC SURGERY
Payer: COMMERCIAL

## 2025-05-16 DIAGNOSIS — M17.12 UNILATERAL PRIMARY OSTEOARTHRITIS, LEFT KNEE: ICD-10-CM

## 2025-05-16 DIAGNOSIS — Z96.652 TOTAL KNEE REPLACEMENT STATUS, LEFT: Primary | ICD-10-CM

## 2025-05-16 PROCEDURE — C1713 ANCHOR/SCREW BN/BN,TIS/BN: HCPCS | Performed by: ORTHOPAEDIC SURGERY

## 2025-05-16 PROCEDURE — C1776 JOINT DEVICE (IMPLANTABLE): HCPCS | Performed by: ORTHOPAEDIC SURGERY

## 2025-05-16 PROCEDURE — 2500000005 HC RX 250 GENERAL PHARMACY W/O HCPCS: Performed by: ANESTHESIOLOGY

## 2025-05-16 PROCEDURE — 97161 PT EVAL LOW COMPLEX 20 MIN: CPT | Mod: GP

## 2025-05-16 PROCEDURE — 2780000003 HC OR 278 NO HCPCS: Performed by: ORTHOPAEDIC SURGERY

## 2025-05-16 PROCEDURE — 2500000004 HC RX 250 GENERAL PHARMACY W/ HCPCS (ALT 636 FOR OP/ED): Mod: JZ | Performed by: ANESTHESIOLOGY

## 2025-05-16 PROCEDURE — 7100000011 HC EXTENDED STAY RECOVERY HOURLY - NURSING UNIT

## 2025-05-16 PROCEDURE — 97116 GAIT TRAINING THERAPY: CPT | Mod: GP

## 2025-05-16 PROCEDURE — 2500000002 HC RX 250 W HCPCS SELF ADMINISTERED DRUGS (ALT 637 FOR MEDICARE OP, ALT 636 FOR OP/ED): Performed by: ORTHOPAEDIC SURGERY

## 2025-05-16 PROCEDURE — 2500000005 HC RX 250 GENERAL PHARMACY W/O HCPCS: Performed by: STUDENT IN AN ORGANIZED HEALTH CARE EDUCATION/TRAINING PROGRAM

## 2025-05-16 PROCEDURE — 2500000002 HC RX 250 W HCPCS SELF ADMINISTERED DRUGS (ALT 637 FOR MEDICARE OP, ALT 636 FOR OP/ED): Performed by: STUDENT IN AN ORGANIZED HEALTH CARE EDUCATION/TRAINING PROGRAM

## 2025-05-16 PROCEDURE — 2500000001 HC RX 250 WO HCPCS SELF ADMINISTERED DRUGS (ALT 637 FOR MEDICARE OP): Performed by: ORTHOPAEDIC SURGERY

## 2025-05-16 PROCEDURE — 7100000001 HC RECOVERY ROOM TIME - INITIAL BASE CHARGE: Performed by: ORTHOPAEDIC SURGERY

## 2025-05-16 PROCEDURE — 2500000004 HC RX 250 GENERAL PHARMACY W/ HCPCS (ALT 636 FOR OP/ED): Mod: JZ | Performed by: ORTHOPAEDIC SURGERY

## 2025-05-16 PROCEDURE — 3600000017 HC OR TIME - EACH INCREMENTAL 1 MINUTE - PROCEDURE LEVEL SIX: Performed by: ORTHOPAEDIC SURGERY

## 2025-05-16 PROCEDURE — 2500000001 HC RX 250 WO HCPCS SELF ADMINISTERED DRUGS (ALT 637 FOR MEDICARE OP): Performed by: PHYSICIAN ASSISTANT

## 2025-05-16 PROCEDURE — 2500000004 HC RX 250 GENERAL PHARMACY W/ HCPCS (ALT 636 FOR OP/ED): Mod: JZ

## 2025-05-16 PROCEDURE — 3700000001 HC GENERAL ANESTHESIA TIME - INITIAL BASE CHARGE: Performed by: ORTHOPAEDIC SURGERY

## 2025-05-16 PROCEDURE — 27447 TOTAL KNEE ARTHROPLASTY: CPT | Performed by: ORTHOPAEDIC SURGERY

## 2025-05-16 PROCEDURE — 3700000002 HC GENERAL ANESTHESIA TIME - EACH INCREMENTAL 1 MINUTE: Performed by: ORTHOPAEDIC SURGERY

## 2025-05-16 PROCEDURE — 2500000004 HC RX 250 GENERAL PHARMACY W/ HCPCS (ALT 636 FOR OP/ED): Performed by: STUDENT IN AN ORGANIZED HEALTH CARE EDUCATION/TRAINING PROGRAM

## 2025-05-16 PROCEDURE — 88305 TISSUE EXAM BY PATHOLOGIST: CPT | Mod: TC,STJLAB,WESLAB | Performed by: ORTHOPAEDIC SURGERY

## 2025-05-16 PROCEDURE — 88305 TISSUE EXAM BY PATHOLOGIST: CPT | Performed by: STUDENT IN AN ORGANIZED HEALTH CARE EDUCATION/TRAINING PROGRAM

## 2025-05-16 PROCEDURE — 20985 CPTR-ASST DIR MS PX: CPT | Performed by: ORTHOPAEDIC SURGERY

## 2025-05-16 PROCEDURE — 2720000007 HC OR 272 NO HCPCS: Performed by: ORTHOPAEDIC SURGERY

## 2025-05-16 PROCEDURE — 2500000001 HC RX 250 WO HCPCS SELF ADMINISTERED DRUGS (ALT 637 FOR MEDICARE OP): Performed by: STUDENT IN AN ORGANIZED HEALTH CARE EDUCATION/TRAINING PROGRAM

## 2025-05-16 PROCEDURE — 7100000002 HC RECOVERY ROOM TIME - EACH INCREMENTAL 1 MINUTE: Performed by: ORTHOPAEDIC SURGERY

## 2025-05-16 PROCEDURE — 3600000018 HC OR TIME - INITIAL BASE CHARGE - PROCEDURE LEVEL SIX: Performed by: ORTHOPAEDIC SURGERY

## 2025-05-16 DEVICE — PRIMARY TIBIAL BASEPLATE
Type: IMPLANTABLE DEVICE | Site: KNEE | Status: FUNCTIONAL
Brand: TRIATHLON

## 2025-05-16 DEVICE — CRUCIATE RETAINING FEMORAL
Type: IMPLANTABLE DEVICE | Site: KNEE | Status: FUNCTIONAL
Brand: TRIATHLON

## 2025-05-16 DEVICE — IMPLANTABLE DEVICE
Type: IMPLANTABLE DEVICE | Site: KNEE | Status: FUNCTIONAL
Brand: BIOMET® BONE CEMENT R

## 2025-05-16 DEVICE — TIBIAL BEARING INSERT - CS
Type: IMPLANTABLE DEVICE | Site: KNEE | Status: FUNCTIONAL
Brand: TRIATHLON

## 2025-05-16 DEVICE — ASYMMETRIC PATELLA
Type: IMPLANTABLE DEVICE | Site: KNEE | Status: FUNCTIONAL
Brand: TRIATHLON

## 2025-05-16 RX ORDER — MORPHINE SULFATE 2 MG/ML
2 INJECTION, SOLUTION INTRAMUSCULAR; INTRAVENOUS EVERY 2 HOUR PRN
Status: DISCONTINUED | OUTPATIENT
Start: 2025-05-16 | End: 2025-05-18 | Stop reason: HOSPADM

## 2025-05-16 RX ORDER — HYDROMORPHONE HYDROCHLORIDE 1 MG/ML
1 INJECTION, SOLUTION INTRAMUSCULAR; INTRAVENOUS; SUBCUTANEOUS EVERY 5 MIN PRN
Status: DISCONTINUED | OUTPATIENT
Start: 2025-05-16 | End: 2025-05-16 | Stop reason: HOSPADM

## 2025-05-16 RX ORDER — TRANEXAMIC ACID 650 MG/1
1950 TABLET ORAL ONCE
Status: COMPLETED | OUTPATIENT
Start: 2025-05-16 | End: 2025-05-16

## 2025-05-16 RX ORDER — ONDANSETRON HYDROCHLORIDE 2 MG/ML
4 INJECTION, SOLUTION INTRAVENOUS EVERY 8 HOURS PRN
Status: DISCONTINUED | OUTPATIENT
Start: 2025-05-16 | End: 2025-05-18 | Stop reason: HOSPADM

## 2025-05-16 RX ORDER — VALSARTAN 160 MG/1
80 TABLET ORAL DAILY
Status: DISCONTINUED | OUTPATIENT
Start: 2025-05-16 | End: 2025-05-18 | Stop reason: HOSPADM

## 2025-05-16 RX ORDER — ACETAMINOPHEN 325 MG/1
650 TABLET ORAL EVERY 6 HOURS SCHEDULED
Status: DISCONTINUED | OUTPATIENT
Start: 2025-05-16 | End: 2025-05-18 | Stop reason: HOSPADM

## 2025-05-16 RX ORDER — ONDANSETRON 4 MG/1
4 TABLET, ORALLY DISINTEGRATING ORAL EVERY 8 HOURS PRN
Status: DISCONTINUED | OUTPATIENT
Start: 2025-05-16 | End: 2025-05-18 | Stop reason: HOSPADM

## 2025-05-16 RX ORDER — DOCUSATE SODIUM 100 MG/1
100 CAPSULE, LIQUID FILLED ORAL 2 TIMES DAILY
Status: DISCONTINUED | OUTPATIENT
Start: 2025-05-16 | End: 2025-05-18 | Stop reason: HOSPADM

## 2025-05-16 RX ORDER — CELECOXIB 200 MG/1
200 CAPSULE ORAL ONCE
Status: COMPLETED | OUTPATIENT
Start: 2025-05-16 | End: 2025-05-16

## 2025-05-16 RX ORDER — NALOXONE HYDROCHLORIDE 0.4 MG/ML
0.2 INJECTION, SOLUTION INTRAMUSCULAR; INTRAVENOUS; SUBCUTANEOUS EVERY 5 MIN PRN
Status: DISCONTINUED | OUTPATIENT
Start: 2025-05-16 | End: 2025-05-18 | Stop reason: HOSPADM

## 2025-05-16 RX ORDER — CYCLOBENZAPRINE HCL 10 MG
10 TABLET ORAL 3 TIMES DAILY PRN
Status: DISCONTINUED | OUTPATIENT
Start: 2025-05-16 | End: 2025-05-18 | Stop reason: HOSPADM

## 2025-05-16 RX ORDER — OXYCODONE HYDROCHLORIDE 10 MG/1
10 TABLET ORAL EVERY 4 HOURS PRN
Refills: 0 | Status: DISCONTINUED | OUTPATIENT
Start: 2025-05-16 | End: 2025-05-18 | Stop reason: HOSPADM

## 2025-05-16 RX ORDER — MIDAZOLAM HYDROCHLORIDE 1 MG/ML
1 INJECTION, SOLUTION INTRAMUSCULAR; INTRAVENOUS ONCE AS NEEDED
Status: DISCONTINUED | OUTPATIENT
Start: 2025-05-16 | End: 2025-05-16 | Stop reason: HOSPADM

## 2025-05-16 RX ORDER — KETOROLAC TROMETHAMINE 30 MG/ML
30 INJECTION, SOLUTION INTRAMUSCULAR; INTRAVENOUS EVERY 6 HOURS
Status: COMPLETED | OUTPATIENT
Start: 2025-05-16 | End: 2025-05-17

## 2025-05-16 RX ORDER — OXYCODONE HYDROCHLORIDE 5 MG/1
5 TABLET ORAL EVERY 4 HOURS PRN
Status: DISCONTINUED | OUTPATIENT
Start: 2025-05-16 | End: 2025-05-18 | Stop reason: HOSPADM

## 2025-05-16 RX ORDER — HYDRALAZINE HYDROCHLORIDE 20 MG/ML
5 INJECTION INTRAMUSCULAR; INTRAVENOUS EVERY 30 MIN PRN
Status: DISCONTINUED | OUTPATIENT
Start: 2025-05-16 | End: 2025-05-16 | Stop reason: HOSPADM

## 2025-05-16 RX ORDER — CETIRIZINE HYDROCHLORIDE 1 MG/ML
10 SOLUTION ORAL DAILY
Status: DISCONTINUED | OUTPATIENT
Start: 2025-05-17 | End: 2025-05-16

## 2025-05-16 RX ORDER — CEFAZOLIN SODIUM 2 G/100ML
2 INJECTION, SOLUTION INTRAVENOUS EVERY 8 HOURS
Status: COMPLETED | OUTPATIENT
Start: 2025-05-16 | End: 2025-05-17

## 2025-05-16 RX ORDER — ROPIVACAINE/EPI/CLONIDINE/KET 2.46-0.005
SYRINGE (ML) INJECTION AS NEEDED
Status: DISCONTINUED | OUTPATIENT
Start: 2025-05-16 | End: 2025-05-16 | Stop reason: HOSPADM

## 2025-05-16 RX ORDER — BISACODYL 5 MG
10 TABLET, DELAYED RELEASE (ENTERIC COATED) ORAL DAILY PRN
Status: DISCONTINUED | OUTPATIENT
Start: 2025-05-16 | End: 2025-05-18 | Stop reason: HOSPADM

## 2025-05-16 RX ORDER — HYDROMORPHONE HYDROCHLORIDE 1 MG/ML
INJECTION, SOLUTION INTRAMUSCULAR; INTRAVENOUS; SUBCUTANEOUS AS NEEDED
Status: DISCONTINUED | OUTPATIENT
Start: 2025-05-16 | End: 2025-05-16

## 2025-05-16 RX ORDER — ACETAMINOPHEN 325 MG/1
650 TABLET ORAL ONCE
Status: COMPLETED | OUTPATIENT
Start: 2025-05-16 | End: 2025-05-16

## 2025-05-16 RX ORDER — CETIRIZINE HYDROCHLORIDE 10 MG/1
10 TABLET ORAL DAILY
Status: DISCONTINUED | OUTPATIENT
Start: 2025-05-16 | End: 2025-05-18 | Stop reason: HOSPADM

## 2025-05-16 RX ORDER — FEXOFENADINE HCL 180 MG/1
180 TABLET ORAL DAILY PRN
COMMUNITY

## 2025-05-16 RX ORDER — LIDOCAINE HCL/PF 100 MG/5ML
SYRINGE (ML) INTRAVENOUS AS NEEDED
Status: DISCONTINUED | OUTPATIENT
Start: 2025-05-16 | End: 2025-05-16

## 2025-05-16 RX ORDER — SODIUM CHLORIDE, SODIUM LACTATE, POTASSIUM CHLORIDE, CALCIUM CHLORIDE 600; 310; 30; 20 MG/100ML; MG/100ML; MG/100ML; MG/100ML
50 INJECTION, SOLUTION INTRAVENOUS CONTINUOUS
Status: ACTIVE | OUTPATIENT
Start: 2025-05-16 | End: 2025-05-17

## 2025-05-16 RX ORDER — NEBIVOLOL 2.5 MG/1
5 TABLET ORAL DAILY
Status: DISCONTINUED | OUTPATIENT
Start: 2025-05-17 | End: 2025-05-18 | Stop reason: HOSPADM

## 2025-05-16 RX ORDER — TRANEXAMIC ACID 650 MG/1
1950 TABLET ORAL ONCE
Status: COMPLETED | OUTPATIENT
Start: 2025-05-17 | End: 2025-05-17

## 2025-05-16 RX ORDER — DIPHENHYDRAMINE HYDROCHLORIDE 50 MG/ML
12.5 INJECTION, SOLUTION INTRAMUSCULAR; INTRAVENOUS ONCE AS NEEDED
Status: DISCONTINUED | OUTPATIENT
Start: 2025-05-16 | End: 2025-05-16 | Stop reason: HOSPADM

## 2025-05-16 RX ORDER — FENTANYL CITRATE 50 UG/ML
INJECTION, SOLUTION INTRAMUSCULAR; INTRAVENOUS AS NEEDED
Status: DISCONTINUED | OUTPATIENT
Start: 2025-05-16 | End: 2025-05-16

## 2025-05-16 RX ORDER — LIDOCAINE HYDROCHLORIDE 10 MG/ML
0.1 INJECTION, SOLUTION INFILTRATION; PERINEURAL ONCE
Status: DISCONTINUED | OUTPATIENT
Start: 2025-05-16 | End: 2025-05-16 | Stop reason: HOSPADM

## 2025-05-16 RX ORDER — SODIUM CHLORIDE, SODIUM LACTATE, POTASSIUM CHLORIDE, CALCIUM CHLORIDE 600; 310; 30; 20 MG/100ML; MG/100ML; MG/100ML; MG/100ML
100 INJECTION, SOLUTION INTRAVENOUS CONTINUOUS
Status: DISCONTINUED | OUTPATIENT
Start: 2025-05-16 | End: 2025-05-16 | Stop reason: HOSPADM

## 2025-05-16 RX ORDER — OXYCODONE HYDROCHLORIDE 10 MG/1
10 TABLET ORAL EVERY 6 HOURS PRN
Status: DISCONTINUED | OUTPATIENT
Start: 2025-05-16 | End: 2025-05-16

## 2025-05-16 RX ORDER — OXYCODONE HYDROCHLORIDE 5 MG/1
5 TABLET ORAL EVERY 4 HOURS PRN
Status: DISCONTINUED | OUTPATIENT
Start: 2025-05-16 | End: 2025-05-16 | Stop reason: HOSPADM

## 2025-05-16 RX ORDER — MIDAZOLAM HYDROCHLORIDE 1 MG/ML
INJECTION, SOLUTION INTRAMUSCULAR; INTRAVENOUS AS NEEDED
Status: DISCONTINUED | OUTPATIENT
Start: 2025-05-16 | End: 2025-05-16

## 2025-05-16 RX ORDER — ASPIRIN 81 MG/1
81 TABLET ORAL 2 TIMES DAILY
Status: DISCONTINUED | OUTPATIENT
Start: 2025-05-16 | End: 2025-05-18 | Stop reason: HOSPADM

## 2025-05-16 RX ORDER — ALBUTEROL SULFATE 0.83 MG/ML
2.5 SOLUTION RESPIRATORY (INHALATION) ONCE AS NEEDED
Status: DISCONTINUED | OUTPATIENT
Start: 2025-05-16 | End: 2025-05-16 | Stop reason: HOSPADM

## 2025-05-16 RX ORDER — MEPERIDINE HYDROCHLORIDE 50 MG/ML
12.5 INJECTION INTRAMUSCULAR; INTRAVENOUS; SUBCUTANEOUS EVERY 10 MIN PRN
Status: DISCONTINUED | OUTPATIENT
Start: 2025-05-16 | End: 2025-05-16 | Stop reason: HOSPADM

## 2025-05-16 RX ORDER — PHENYLEPHRINE HCL IN 0.9% NACL 1 MG/10 ML
SYRINGE (ML) INTRAVENOUS AS NEEDED
Status: DISCONTINUED | OUTPATIENT
Start: 2025-05-16 | End: 2025-05-16

## 2025-05-16 RX ORDER — ONDANSETRON HYDROCHLORIDE 2 MG/ML
INJECTION, SOLUTION INTRAVENOUS AS NEEDED
Status: DISCONTINUED | OUTPATIENT
Start: 2025-05-16 | End: 2025-05-16

## 2025-05-16 RX ORDER — ACETAMINOPHEN 325 MG/1
975 TABLET ORAL ONCE
Status: DISCONTINUED | OUTPATIENT
Start: 2025-05-16 | End: 2025-05-16 | Stop reason: HOSPADM

## 2025-05-16 RX ORDER — MONTELUKAST SODIUM 10 MG/1
10 TABLET ORAL NIGHTLY
Status: DISCONTINUED | OUTPATIENT
Start: 2025-05-17 | End: 2025-05-18 | Stop reason: HOSPADM

## 2025-05-16 RX ORDER — CEFAZOLIN SODIUM 2 G/100ML
2 INJECTION, SOLUTION INTRAVENOUS ONCE
Status: COMPLETED | OUTPATIENT
Start: 2025-05-16 | End: 2025-05-16

## 2025-05-16 RX ORDER — FENTANYL CITRATE 50 UG/ML
12.5 INJECTION, SOLUTION INTRAMUSCULAR; INTRAVENOUS EVERY 5 MIN PRN
Status: DISCONTINUED | OUTPATIENT
Start: 2025-05-16 | End: 2025-05-16 | Stop reason: HOSPADM

## 2025-05-16 RX ORDER — PROPOFOL 10 MG/ML
INJECTION, EMULSION INTRAVENOUS AS NEEDED
Status: DISCONTINUED | OUTPATIENT
Start: 2025-05-16 | End: 2025-05-16

## 2025-05-16 RX ORDER — ONDANSETRON HYDROCHLORIDE 2 MG/ML
4 INJECTION, SOLUTION INTRAVENOUS ONCE AS NEEDED
Status: COMPLETED | OUTPATIENT
Start: 2025-05-16 | End: 2025-05-16

## 2025-05-16 RX ADMIN — ACETAMINOPHEN 650 MG: 325 TABLET ORAL at 09:23

## 2025-05-16 RX ADMIN — ONDANSETRON 4 MG: 2 INJECTION INTRAMUSCULAR; INTRAVENOUS at 13:00

## 2025-05-16 RX ADMIN — MIDAZOLAM 1 MG: 1 INJECTION INTRAMUSCULAR; INTRAVENOUS at 10:35

## 2025-05-16 RX ADMIN — CEFAZOLIN SODIUM 2 G: 2 INJECTION, SOLUTION INTRAVENOUS at 10:35

## 2025-05-16 RX ADMIN — ASPIRIN 81 MG: 81 TABLET, COATED ORAL at 20:05

## 2025-05-16 RX ADMIN — CEFAZOLIN SODIUM 2 G: 2 INJECTION, SOLUTION INTRAVENOUS at 18:47

## 2025-05-16 RX ADMIN — FENTANYL CITRATE 50 MCG: 50 INJECTION, SOLUTION INTRAMUSCULAR; INTRAVENOUS at 11:03

## 2025-05-16 RX ADMIN — FENTANYL CITRATE 25 MCG: 50 INJECTION, SOLUTION INTRAMUSCULAR; INTRAVENOUS at 11:13

## 2025-05-16 RX ADMIN — PROPOFOL 150 MG: 10 INJECTION, EMULSION INTRAVENOUS at 10:58

## 2025-05-16 RX ADMIN — PROPOFOL 25 MCG/KG/MIN: 10 INJECTION, EMULSION INTRAVENOUS at 11:03

## 2025-05-16 RX ADMIN — ACETAMINOPHEN 650 MG: 325 TABLET ORAL at 23:45

## 2025-05-16 RX ADMIN — CELECOXIB 200 MG: 200 CAPSULE ORAL at 09:23

## 2025-05-16 RX ADMIN — Medication 100 MCG: at 12:01

## 2025-05-16 RX ADMIN — TRANEXAMIC ACID 1950 MG: 650 TABLET ORAL at 18:47

## 2025-05-16 RX ADMIN — Medication 100 MCG: at 11:25

## 2025-05-16 RX ADMIN — POVIDONE-IODINE 1 APPLICATION: 5 SOLUTION TOPICAL at 09:23

## 2025-05-16 RX ADMIN — HYDROMORPHONE HYDROCHLORIDE 0.25 MG: 1 INJECTION, SOLUTION INTRAMUSCULAR; INTRAVENOUS; SUBCUTANEOUS at 12:14

## 2025-05-16 RX ADMIN — Medication 100 MCG: at 11:57

## 2025-05-16 RX ADMIN — LIDOCAINE HYDROCHLORIDE 100 MG: 20 INJECTION INTRAVENOUS at 10:58

## 2025-05-16 RX ADMIN — Medication 100 MCG: at 11:08

## 2025-05-16 RX ADMIN — HYDROMORPHONE HYDROCHLORIDE 0.5 MG: 1 INJECTION, SOLUTION INTRAMUSCULAR; INTRAVENOUS; SUBCUTANEOUS at 13:12

## 2025-05-16 RX ADMIN — Medication 100 MCG: at 11:45

## 2025-05-16 RX ADMIN — MORPHINE SULFATE 2 MG: 2 INJECTION, SOLUTION INTRAMUSCULAR; INTRAVENOUS at 14:54

## 2025-05-16 RX ADMIN — TRANEXAMIC ACID 1950 MG: 650 TABLET ORAL at 09:23

## 2025-05-16 RX ADMIN — Medication 3 L/MIN: at 12:45

## 2025-05-16 RX ADMIN — HYDROMORPHONE HYDROCHLORIDE 0.5 MG: 1 INJECTION, SOLUTION INTRAMUSCULAR; INTRAVENOUS; SUBCUTANEOUS at 12:51

## 2025-05-16 RX ADMIN — KETOROLAC TROMETHAMINE 30 MG: 30 INJECTION, SOLUTION INTRAMUSCULAR at 14:20

## 2025-05-16 RX ADMIN — Medication 200 MCG: at 12:10

## 2025-05-16 RX ADMIN — DOCUSATE SODIUM 100 MG: 100 CAPSULE, LIQUID FILLED ORAL at 20:05

## 2025-05-16 RX ADMIN — CETIRIZINE HYDROCHLORIDE 10 MG: 10 TABLET, FILM COATED ORAL at 20:13

## 2025-05-16 RX ADMIN — HYDROMORPHONE HYDROCHLORIDE 0.5 MG: 1 INJECTION, SOLUTION INTRAMUSCULAR; INTRAVENOUS; SUBCUTANEOUS at 13:00

## 2025-05-16 RX ADMIN — ACETAMINOPHEN 650 MG: 325 TABLET ORAL at 18:47

## 2025-05-16 RX ADMIN — FENTANYL CITRATE 25 MCG: 50 INJECTION, SOLUTION INTRAMUSCULAR; INTRAVENOUS at 11:19

## 2025-05-16 RX ADMIN — ONDANSETRON 4 MG: 2 INJECTION INTRAMUSCULAR; INTRAVENOUS at 11:57

## 2025-05-16 RX ADMIN — KETOROLAC TROMETHAMINE 30 MG: 30 INJECTION, SOLUTION INTRAMUSCULAR at 20:05

## 2025-05-16 RX ADMIN — OXYCODONE HYDROCHLORIDE 5 MG: 5 TABLET ORAL at 23:46

## 2025-05-16 RX ADMIN — SODIUM CHLORIDE, POTASSIUM CHLORIDE, SODIUM LACTATE AND CALCIUM CHLORIDE: 600; 310; 30; 20 INJECTION, SOLUTION INTRAVENOUS at 10:35

## 2025-05-16 SDOH — SOCIAL STABILITY: SOCIAL INSECURITY: WITHIN THE LAST YEAR, HAVE YOU BEEN AFRAID OF YOUR PARTNER OR EX-PARTNER?: NO

## 2025-05-16 SDOH — SOCIAL STABILITY: SOCIAL INSECURITY
WITHIN THE LAST YEAR, HAVE YOU BEEN RAPED OR FORCED TO HAVE ANY KIND OF SEXUAL ACTIVITY BY YOUR PARTNER OR EX-PARTNER?: NO

## 2025-05-16 SDOH — ECONOMIC STABILITY: INCOME INSECURITY: IN THE PAST 12 MONTHS HAS THE ELECTRIC, GAS, OIL, OR WATER COMPANY THREATENED TO SHUT OFF SERVICES IN YOUR HOME?: NO

## 2025-05-16 SDOH — SOCIAL STABILITY: SOCIAL INSECURITY: WITHIN THE LAST YEAR, HAVE YOU BEEN HUMILIATED OR EMOTIONALLY ABUSED IN OTHER WAYS BY YOUR PARTNER OR EX-PARTNER?: NO

## 2025-05-16 SDOH — SOCIAL STABILITY: SOCIAL INSECURITY: HAVE YOU HAD ANY THOUGHTS OF HARMING ANYONE ELSE?: NO

## 2025-05-16 SDOH — ECONOMIC STABILITY: FOOD INSECURITY: WITHIN THE PAST 12 MONTHS, THE FOOD YOU BOUGHT JUST DIDN'T LAST AND YOU DIDN'T HAVE MONEY TO GET MORE.: NEVER TRUE

## 2025-05-16 SDOH — ECONOMIC STABILITY: FOOD INSECURITY: WITHIN THE PAST 12 MONTHS, YOU WORRIED THAT YOUR FOOD WOULD RUN OUT BEFORE YOU GOT THE MONEY TO BUY MORE.: NEVER TRUE

## 2025-05-16 SDOH — SOCIAL STABILITY: SOCIAL INSECURITY: ABUSE: ADULT

## 2025-05-16 SDOH — SOCIAL STABILITY: SOCIAL INSECURITY
WITHIN THE LAST YEAR, HAVE YOU BEEN KICKED, HIT, SLAPPED, OR OTHERWISE PHYSICALLY HURT BY YOUR PARTNER OR EX-PARTNER?: NO

## 2025-05-16 SDOH — SOCIAL STABILITY: SOCIAL INSECURITY: DO YOU FEEL ANYONE HAS EXPLOITED OR TAKEN ADVANTAGE OF YOU FINANCIALLY OR OF YOUR PERSONAL PROPERTY?: NO

## 2025-05-16 SDOH — ECONOMIC STABILITY: HOUSING INSECURITY: DO YOU FEEL UNSAFE GOING BACK TO THE PLACE WHERE YOU LIVE?: NO

## 2025-05-16 SDOH — SOCIAL STABILITY: SOCIAL INSECURITY: HAS ANYONE EVER THREATENED TO HURT YOUR FAMILY OR YOUR PETS?: NO

## 2025-05-16 SDOH — SOCIAL STABILITY: SOCIAL INSECURITY: DO YOU FEEL UNSAFE GOING BACK TO THE PLACE WHERE YOU ARE LIVING?: NO

## 2025-05-16 SDOH — SOCIAL STABILITY: SOCIAL INSECURITY: DOES ANYONE TRY TO KEEP YOU FROM HAVING/CONTACTING OTHER FRIENDS OR DOING THINGS OUTSIDE YOUR HOME?: NO

## 2025-05-16 SDOH — SOCIAL STABILITY: SOCIAL INSECURITY: ARE YOU OR HAVE YOU BEEN THREATENED OR ABUSED PHYSICALLY, EMOTIONALLY, OR SEXUALLY BY ANYONE?: NO

## 2025-05-16 SDOH — SOCIAL STABILITY: SOCIAL INSECURITY: WERE YOU ABLE TO COMPLETE ALL THE BEHAVIORAL HEALTH SCREENINGS?: YES

## 2025-05-16 SDOH — SOCIAL STABILITY: SOCIAL INSECURITY: ARE THERE ANY APPARENT SIGNS OF INJURIES/BEHAVIORS THAT COULD BE RELATED TO ABUSE/NEGLECT?: NO

## 2025-05-16 SDOH — SOCIAL STABILITY: SOCIAL INSECURITY: HAVE YOU HAD THOUGHTS OF HARMING ANYONE ELSE?: NO

## 2025-05-16 ASSESSMENT — COGNITIVE AND FUNCTIONAL STATUS - GENERAL
MOBILITY SCORE: 18
STANDING UP FROM CHAIR USING ARMS: A LITTLE
CLIMB 3 TO 5 STEPS WITH RAILING: A LITTLE
WALKING IN HOSPITAL ROOM: A LITTLE
TURNING FROM BACK TO SIDE WHILE IN FLAT BAD: A LITTLE
CLIMB 3 TO 5 STEPS WITH RAILING: A LOT
WALKING IN HOSPITAL ROOM: A LITTLE
MOVING TO AND FROM BED TO CHAIR: A LITTLE
DRESSING REGULAR LOWER BODY CLOTHING: A LITTLE
DRESSING REGULAR UPPER BODY CLOTHING: A LITTLE
TURNING FROM BACK TO SIDE WHILE IN FLAT BAD: A LITTLE
PATIENT BASELINE BEDBOUND: NO
MOBILITY SCORE: 18
DAILY ACTIVITIY SCORE: 18
EATING MEALS: A LITTLE
TOILETING: A LITTLE
MOVING TO AND FROM BED TO CHAIR: A LITTLE
STANDING UP FROM CHAIR USING ARMS: A LITTLE
PERSONAL GROOMING: A LITTLE
MOVING FROM LYING ON BACK TO SITTING ON SIDE OF FLAT BED WITH BEDRAILS: A LITTLE
HELP NEEDED FOR BATHING: A LITTLE

## 2025-05-16 ASSESSMENT — PAIN - FUNCTIONAL ASSESSMENT
PAIN_FUNCTIONAL_ASSESSMENT: 0-10

## 2025-05-16 ASSESSMENT — ACTIVITIES OF DAILY LIVING (ADL)
LACK_OF_TRANSPORTATION: NO
PATIENT'S MEMORY ADEQUATE TO SAFELY COMPLETE DAILY ACTIVITIES?: YES
HEARING - LEFT EAR: FUNCTIONAL
FEEDING YOURSELF: INDEPENDENT
HEARING - RIGHT EAR: FUNCTIONAL
DRESSING YOURSELF: INDEPENDENT
TOILETING: INDEPENDENT
LACK_OF_TRANSPORTATION: NO
ADEQUATE_TO_COMPLETE_ADL: YES
JUDGMENT_ADEQUATE_SAFELY_COMPLETE_DAILY_ACTIVITIES: YES
WALKS IN HOME: INDEPENDENT
GROOMING: INDEPENDENT
BATHING: INDEPENDENT

## 2025-05-16 ASSESSMENT — COLUMBIA-SUICIDE SEVERITY RATING SCALE - C-SSRS
6. HAVE YOU EVER DONE ANYTHING, STARTED TO DO ANYTHING, OR PREPARED TO DO ANYTHING TO END YOUR LIFE?: NO
1. IN THE PAST MONTH, HAVE YOU WISHED YOU WERE DEAD OR WISHED YOU COULD GO TO SLEEP AND NOT WAKE UP?: NO
2. HAVE YOU ACTUALLY HAD ANY THOUGHTS OF KILLING YOURSELF?: NO

## 2025-05-16 ASSESSMENT — PAIN SCALES - GENERAL
PAINLEVEL_OUTOF10: 2
PAINLEVEL_OUTOF10: 5 - MODERATE PAIN
PAINLEVEL_OUTOF10: 6
PAINLEVEL_OUTOF10: 5 - MODERATE PAIN
PAINLEVEL_OUTOF10: 2
PAINLEVEL_OUTOF10: 5 - MODERATE PAIN
PAINLEVEL_OUTOF10: 4
PAINLEVEL_OUTOF10: 3
PAINLEVEL_OUTOF10: 6
PAINLEVEL_OUTOF10: 5 - MODERATE PAIN

## 2025-05-16 ASSESSMENT — PAIN DESCRIPTION - DESCRIPTORS
DESCRIPTORS: SHARP;SORE
DESCRIPTORS: SORE

## 2025-05-16 ASSESSMENT — LIFESTYLE VARIABLES
AUDIT-C TOTAL SCORE: 0
HOW OFTEN DO YOU HAVE A DRINK CONTAINING ALCOHOL: NEVER
SKIP TO QUESTIONS 9-10: 1
HOW MANY STANDARD DRINKS CONTAINING ALCOHOL DO YOU HAVE ON A TYPICAL DAY: PATIENT DOES NOT DRINK
AUDIT-C TOTAL SCORE: 0
HOW OFTEN DO YOU HAVE 6 OR MORE DRINKS ON ONE OCCASION: NEVER

## 2025-05-16 ASSESSMENT — PAIN DESCRIPTION - LOCATION
LOCATION: KNEE
LOCATION: KNEE

## 2025-05-16 ASSESSMENT — PATIENT HEALTH QUESTIONNAIRE - PHQ9
SUM OF ALL RESPONSES TO PHQ9 QUESTIONS 1 & 2: 0
1. LITTLE INTEREST OR PLEASURE IN DOING THINGS: NOT AT ALL
2. FEELING DOWN, DEPRESSED OR HOPELESS: NOT AT ALL

## 2025-05-16 ASSESSMENT — PAIN DESCRIPTION - ORIENTATION: ORIENTATION: LEFT

## 2025-05-16 NOTE — DISCHARGE INSTRUCTIONS
"    Knee Replacement Discharge Instructions:   Dr. Irving Leiva    Physical Therapy / Range of Motion:     Physical therapy should be done daily after surgery.     -  Therapy will commonly begin at home and progress to an outpatient setting (ideally around 2 weeks from surgery).  Despite the convenience of home therapy, Dr. Leiva recommends starting outpatient therapy as soon as possible after surgery.      -  If you find that you are not receiving the appropriate amount of therapy (either at home or at a facility) please call our office immediately.  Therapy CANNOT be postponed or delayed!          !!! Knee replacements should ideally achieve 90 degrees of flexion by 2 weeks from surgery.  Ask your therapist after each session \"What is my RANGE OF MOTION?\"  Your physician will be asking you this question at your follow up appointments.  It is also important to work on getting the knee straight and at rest attempt to keep the knee as straight as possible.    Discharge to rehab:  If you are discharged to a SNF or rehabilitation facility, discharge to home is determined by the specific staff at the facility when they deem you are safe to return home.  Your surgeon and his staff are not allowed to make this decision.  Please inquire with the facility therapist, , and medical personnel.      Medications:   You have been prescribed medication to prevent blood clots (either aspirin or a stronger blood thinner). This medication should be continued for 4 weeks from surgery or longer if on blood thinners prior.   - In addition please wear the ZEKE hose stockings that you were given to help prevent blood clots for 2-4 weeks postoperatively, and perform calf pumps when seated.    2.    A prescription will be given to you upon discharge for a Pain Medicine.  It is common to take your pain medication one hour before scheduled physical therapy. This will allow you to achieve the necessary goals.   - If you need a " refill, please notify the office at LEAST 48 hours before you will be out of your medication.  Pain medications cannot be called in to a pharmacy and require authentication that can only be done during normal business hours (8-5 on Mon-Fri).   We cannot refill pain medications on the weekend.    3.    Prior to dental work in the future an antibiotic will be prescribed.    4.    A stool softener (Colace - over the counter - ) should be taken with the pain medication     You may receive:    5.    A muscle relaxer (Flexeril) is commonly prescribed.  This can cause some drowsiness which may assist sleeping.    6.   A medication for nausea (Zofran, etc)      Wound Care:  Leave waterproof dressing in place.  As long as the dressing is intact and occlusive at the borders, you may shower.   - Remove the dressing at 7 days post-op.    - You may reinforce with other dressing materials as needed (gauze, ACE wraps, etc.).  If drainage is noted, please contact us.    You may shower allowing water to run over the incision site when the dressing is removed if the wound is dry.  If there is any drainage contact us after dressing removal and keep dry.    - DO NOT RUB OR SCRUB INCISION. NO SOAKING IN A TUB OR STANDING WATER UNTIL INCISION IS WELL-HEALED AND SCABS HAVE DISAPPEARED.    -Do not apply any lotions, creams, ointments, peroxide, betadine or gels to incision and surrounding area.          -Apply ice to affected area as tolerated.    -Bruising is common but if any signs of infection (redness, significant warmth, drainage) please contact us    Driving:  Must be off of narcotic pain medication and have good leg control!  Approximately right le-6 weeks and left le weeks.  Please discuss with Dr. Leiva at first post-op visit prior to resuming.     Follow-up Appointments:   Your post op appointment is ~ 3 weeks from surgery.  Please call the office if there is any uncertainty your post-op appointments.     Prior, during,  and after your surgery and hospital stay, please do not hesitate to call our office with any questions or concerns.    Our staff will be happy to assist you in any possible way during your personal journey through joint replacement.     [phone]

## 2025-05-16 NOTE — OP NOTE
Operative Note     Date: 2025  OR Location: [facility] OR    Name: Laureano Elizabeth : 1965, Age: 59 y.o., MRN: [id number], Sex: male    Diagnosis  Pre-op Diagnosis      * Unilateral primary osteoarthritis, left knee [M17.12] Post-op Diagnosis     * Unilateral primary osteoarthritis, left knee [M17.12]     Procedures  Left total knee arthroplasty, computer-assisted surgery    Surgeons      * Irving Leiva - Primary    Resident/Fellow/Other Assistant:  Surgeons and Role:     * Johann Posey PA-C - [facility] Assist    Staff:   Surgical Assistant: Don Avilez Person: Susana Minub Person: Jaymie  Circulator: Tomasa    Anesthesia Staff: Anesthesiologist: Shantanu Blanco DO  CRNA: OCTAVIANO Benton-AMADO  SRNA: Ambrosio Montana    Procedure Summary  Anesthesia: Anesthesia type not filed in the log.  ASA: II  Estimated Blood Loss: 50 mL  Intra-op Medications:   Administrations occurring from 1025 to 1325 on 25:   Medication Name Total Dose   ropivacaine-epinephrine-clonidine-ketorolac 2.46-0.005- 0.0008-0.3mg/mL periarticular syringe 100 mL   fentaNYL (Sublimaze) injection 50 mcg/mL 100 mcg   LR bolus Cannot be calculated   lidocaine PF (cardiac) syringe 2% 100 mg   midazolam (Versed) injection 1 mg/mL 1 mg   phenylephrine 100 mcg/mL syringe 10 mL (prefilled) 300 mcg   propofol (Diprivan) injection 10 mg/mL 150 mg   ceFAZolin (Ancef) 2 g in dextrose (iso)  mL 2 g              Anesthesia Record               Intraprocedure I/O Totals          Intake    ceFAZolin (Ancef) 2 g in dextrose (iso)  mL 100.00 mL    Total Intake 100 mL          Specimen:   ID Type Source Tests Collected by Time   1 : LEFT KNEE SYNOVIUM Tissue SYNOVIUM SURGICAL PATHOLOGY EXAM Irving Leiva MD 2025 1118      Inflamed synovium consistent with either potential chronic crystalline arthropathy versus inflammatory arthropathy             Implants:    Implants       Type Name Action Serial No.      Joint Knee CEMENT,  BONE, BIOMET R, 1X40 - [id number] Implanted      Joint PLATE, TIBIAL TRIATH LALY MENDOZA FXD BPLT P5 - [id number] Implanted      Joint INSERT, TIBIAL, X3 TRIATHLON CS, SZ-5 11MM - [id number] Implanted      Joint FEM COMP, TRIATH CR SZ6 LEFT - [id number] Implanted      Joint PATELLA, TRIATHLON, ASYMMETRIC X3, SZ-A38 11MM - [id number] Implanted               Findings: see procedure details    Indications:     The patient was seen in the preoperative area. The risks, benefits, complications, treatment options, non-operative alternatives, expected recovery and outcomes were discussed with the patient. The possibilities of reaction to medication, pulmonary aspiration, injury to surrounding structures, bleeding, recurrent infection, the need for additional procedures, failure to diagnose a condition, and creating a complication requiring transfusion or operation were discussed with the patient. The patient concurred with the proposed plan, giving informed consent.  The site of surgery was properly noted/marked if necessary per policy. The patient has been actively warmed in preoperative area. Preoperative antibiotics have been ordered and given within 1 hours of incision. Venous thrombosis prophylaxis have been ordered.    The patient failed multiple attempts at non-surgical management.  Specific to TKA we discussed the risks of infection / revision surgery, DVT/PE, medical complications, stiffness / loss of motion, neurologic (foot drop) or vascular injury.    Procedure Details:   Patient was met prior to surgery in pre-operative holding.  The appropriate extremity was marked and recent health status was reviewed and we found no contraindication to proceeding with the scheduled procedure.  We again reviewed the risks of infection, wound issues, deep venous thrombosis, pulmonary embolism, medical complications including cardiac and pulmonary, neurologic and vascular injury and incomplete relief of pre-operative pain.    The  patient discussed regional anesthesia in pre-op holding.  The patient was transported to the operating room.  A thigh tourniquet was placed and a small bump on the buttock.  The patient was resting comfortably in a supine position with all nova prominences well padded.  Sequential compression device was placed on the contralateral lower extremity.  An exam under anesthesia was performed to evaluate the patient´s range of motion and ligamentous integrity.    The patient was prepped and draped in the usual sterile fashion.  The leg was placed in a well padded leg childers.  After prep, drape, antibiotic and time out, the leg was exsanguinated and the tourniquet inflated.  A longitudinal incision was made over the anterior knee.  The dissection was carried out through the skin and subcutaneous tissue.  A medial parapatellar arthrotomy was performed.  An effusion and synovitis was noted compatible with the grade IV changes of the articular cartilage.  The fat pad was slightly de-bulked, León´s line was marked and some of the deep medial collateral ligament was released from the tibia.  The ACL was resected. The knee was flexed up and medial and lateral retractors were placed to protect the collateral ligaments and popliteus tendon.      Due to the degree of deformity and stiffness, the posterior cruciate ligament was resected.  The synovium was fairly inflamed consistent with a possible chronic arthropathy, inflammatory versus crystalline etc. synovium sent to pathology.    We then placed two pins in the tibia distal to the medial joint line directed in a posterolateral direction.  We placed two pins proximal and medial in the distal femur angled laterally.  Two arrays were placed and the registration and check points were completed.     The checkpoints were then registered on the femur and the tibia.  Once this was complete we used the spacers, duran elevator and manual stress to confirm ideal balancing and  resection.  Once this was completed we adjusted the resection and rotation to match our pre-operative plan with our intraoperative data.     The robot was draped in sterile fashion and brought into the field.  The saw was registered and check points confirmed.  The saw was used to complete the cuts.  Retractors were used to protect the MCL and patellar tendon.  The haptic feedback removed any aberrant cuts and all cuts were consistent with the preoperative plan.    The gaps appeared symmetric with a spacer and we proceeded to placing trial implants.  With symmetric gaps we progressed to placing trial implants.  The final poly was a 11 CS.  The motion and alignment was confirmed with the trials in place.    A trial tibial component was placed with care to avoid overhang.  The rotation was set in line with the medial third of the tibial tubercle.  A trial femoral component was then placed, followed by a trial articular surface.  The knee was taken through range of motion with the provisional components.  The flexion and extension gaps were evaluated, as well as the patellar tracking and mid-flexion stability.  The following soft tissue balancing, recuts, and/or modifications were required: none.      The patella was everted and ~9 mm of bone were removed from the thickest portion using a clamp/cutting guide.  The lug holes were drilled in the patella and femur.  The tibia was prepared with the drill and broach after confirmation of alignment using a drop ramu.   The sclerotic areas of the bone were drilled using a small drill bit.  The capsule around the knee was injected using a long acting local anesthetic / multimodal pain cockatil.    The cement was mixed in a vacuum sealed fashion while the bone was pulsatile lavaged.   The bone was dried and the implants were placed with a gentle posterior directed force and a clamp on the patella.  The excess cement was removed.   After the cement dried the gap balancing was  reassessed prior to placing the final articular surface.  The proud cement mantle was removed using a small osteotome.  The knee was then copiously lavaged with sterile saline and Irrisept (0.05% chlorhexidine gluconate).  The tourniquet was taken down and hemostasis was obtained using Bovie electrocautery and tranexamic acid (per protocol).  No significant bleeders were encountered and the usage of a drain was not felt to be necessary.    A layered closure was performed using 1 Vicryl and 1 Stratafix in the retinacular layer and quadriceps tendon.  2-0 vicryl in the deep dermal layer and monofilament in the skin.  An adherent, water proof dressing was placed followed by Webril and an ace bandage. All counts were reported as correct.  The patient was stable to the post anesthesia care unit.    I was present and participated in the entire procedure. The Physician Assistant participated in all critical elements of the procedure under my direct supervision. The surgical incision was closed by the PA under my indirect supervision. There were no qualified residents available to assist.    The physician assistant was present for the entire case.  Given the nature of the procedure and disease process, a skilled surgical assistant was necessary for the case.  The assistant was necessary for retraction and helped directly facilitate completion of the surgery.  A certified scrub tech was at the back table managing instruments and supplies for the surgical procedure.    Complications:  None; patient tolerated the procedure well.    Disposition: PACU - hemodynamically stable.  Condition: stable         Irving W Leiva  Phone Number: [phone]

## 2025-05-16 NOTE — CARE PLAN
The patient's goals for the shift include      The clinical goals for the shift include      Pt will have pain control. Pt was able to get oob with asssitance.

## 2025-05-16 NOTE — ANESTHESIA PREPROCEDURE EVALUATION
Patient: Laureano Elizabeth    Procedure Information       Date/Time: 05/16/25 1025    Procedure: LEFT KNEE ARTHROPLASTY (Left: Knee) - 23HR OBSERVATION    Location: [facility] OR 06 / [facility] OR    Surgeons: Irving Leiva MD            Relevant Problems   Cardiac   (+) Benign essential HTN      Musculoskeletal   (+) Unilateral primary osteoarthritis, left knee       Clinical information reviewed:    Allergies  Meds               NPO Detail:  No data recorded     Physical Exam    Airway  Mallampati: II  TM distance: >3 FB  Neck ROM: full  Mouth opening: 3 or more finger widths     Cardiovascular - normal exam   Dental    Pulmonary - normal exam   Abdominal - normal exam           Anesthesia Plan    History of general anesthesia?: yes  History of complications of general anesthesia?: no    ASA 2     regional, MAC and spinal     intravenous induction   Anesthetic plan and risks discussed with patient.    Plan discussed with CRNA.

## 2025-05-16 NOTE — ANESTHESIA PROCEDURE NOTES
Airway  Date/Time: 5/16/2025 11:00 AM  Reason: elective    Airway not difficult    Staffing  Performed: OXANA   Authorized by: Shantanu Blanco DO    Performed by: Ambrosio Montana  Patient location during procedure: OR    Patient Condition  Indications for airway management: anesthesia  Patient position: sniffing  Planned trial extubation  Sedation level: deep     Final Airway Details   Preoxygenated: yes  Final airway type: supraglottic airway  Successful airway: Supraglottic airway: IGel.  Size: 5  Number of attempts at approach: 1  Number of other approaches attempted: 0    Additional Comments  Lips and teeth in preanesthetic condition after LMA placement.

## 2025-05-16 NOTE — PROGRESS NOTES
Physical Therapy    Physical Therapy Evaluation    Patient Name: Laureano Elizabeth  MRN: [id number]  Today's Date: 5/16/2025   Time Calculation  Start Time: 1532  Stop Time: 1606  Time Calculation (min): 34 min  4124/4124-A    Assessment/Plan   PT Assessment  PT Assessment Results: Decreased strength, Decreased range of motion, Decreased endurance, Decreased mobility, Pain  Rehab Prognosis: Good  Evaluation/Treatment Tolerance: Patient limited by pain, Patient limited by fatigue  Medical Staff Made Aware: Yes  Strengths: Ability to acquire knowledge, Attitude of self  End of Session Communication: Bedside nurse  Assessment Comment: expect better progress tomorrow.  End of Session Patient Position:  (sat at EOB after walking. RN made aware.)  IP OR SWING BED PT PLAN  Inpatient or Swing Bed: Inpatient  PT Plan  Treatment/Interventions: Bed mobility, Transfer training, Gait training, Stair training, Strengthening, Endurance training, Range of motion, Therapeutic exercise, Therapeutic activity, Home exercise program (Issued a handout and instructed in a/p, qs, and gs exercises, to work on tonight.)  PT Plan: Ongoing PT  PT Frequency: BID  PT Discharge Recommendations: Low intensity level of continued care  Equipment Recommended upon Discharge: Wheeled walker, Straight cane  PT Recommended Transfer Status: Contact guard, Assist x1  PT - OK to Discharge: Yes (When medically allowed.)    Subjective     Current Problem:  1. Unilateral primary osteoarthritis, left knee  Surgical Pathology Exam    Surgical Pathology Exam        Problem List[1]    General Visit Information:  General  Reason for Referral: L TKR  Referred By: ADENIKE Leiva  Past Medical History Relevant to Rehab: HTN, O-A, LVH, asthma.  Family/Caregiver Present: Yes  Prior to Session Communication: Bedside nurse  Patient Position Received: Bed, 3 rail up, Alarm on  Preferred Learning Style: verbal, visual, written  General Comment: Had a general and n. block.    Has an IV, ice pack, SCD's., and 2L O2.  Reports feeling tired and dizzy when asked how he's doing.  He agreed to try thi PT eval..    Home Living:  Home Living  Type of Home: House  Lives With: Alone (But will have 2 daughters stay with him for awhile.)  Home Adaptive Equipment: Walker rolling or standard, Cane  Home Layout: Two level  Home Access: Stairs to enter without rails  Entrance Stairs-Number of Steps: 3  Bathroom Shower/Tub: Tub/shower unit, Walk-in shower  Bathroom Equipment: Grab bars in shower  Home Living Comments: stays on 1st floor.  Tub on 1st, w-in upstairs.    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    Precautions:  Precautions  LE Weight Bearing Status: Weight Bearing as Tolerated  Medical Precautions: Fall precautions  Post-Surgical Precautions: Left total knee precautions  Precautions Comment: No pivoting.  Use ice only 20 mins./hr..    Vital Signs:  Vital Signs  Heart Rate: 75  SpO2: 97 %  BP: 114/68  Objective     Pain:  Pain Assessment  Pain Assessment: 0-10  0-10 (Numeric) Pain Score: 6 (Decreased to a 3/10 after walking.)  Pain Type: Surgical pain  Pain Location: Knee  Pain Orientation: Left  Pain Interventions: Medication (See MAR), Cold pack, Elevated, Rest    Cognition:  Cognition  Overall Cognitive Status: Within Functional Limits    General Assessments:  General Observation  General Observation: Pleasant. Overwt..   Activity Tolerance  Endurance: Endurance does not limit participation in activity     Strength  Strength Comments: 3-/5= L Quads..  F= SLR     Coordination  Movements are Fluid and Coordinated: Yes  Postural Control  Posture Comment: 5'10'' ht..   Obese.          Functional Assessments:     Bed Mobility  Bed Mobility: Yes  Bed Mobility 1  Bed Mobility 1: Supine to  "sitting  Level of Assistance 1: Close supervision  Bed Mobility Comments 1: ended this eval. sitting at bedside.  RN made aware.  Transfers  Transfer: Yes  Transfer 1  Transfer From 1: Sit to, Stand to  Transfer Device 1: Walker  Transfer Level of Assistance 1: Contact guard  Ambulation/Gait Training  Ambulation/Gait Training Performed: Yes  Ambulation/Gait Training 1  Surface 1: Level tile  Device 1: Rolling walker  Gait Support Devices: Gait belt  Assistance 1: Contact guard  Comments/Distance (ft) 1: x 35 ft. to bathroom and back.    Used a \"step to\" pattern.  Occasionally needed reminder cues for W-L-R stepping pattern.  Stairs  Stairs: No       Extremity/Trunk Assessments:        RLE   RLE : Within Functional Limits  LLE   LLE : Exceptions to WFL  AROM LLE (degrees)  L Knee Flexion 0-130: 85  L Knee Extension 0-130: 10    Outcome Measures:     [facility] Basic Mobility  Turning from your back to your side while in a flat bed without using bedrails: None  Moving from lying on your back to sitting on the side of a flat bed without using bedrails: A little  Moving to and from bed to chair (including a wheelchair): A little  Standing up from a chair using your arms (e.g. wheelchair or bedside chair): A little  To walk in hospital room: A little  Climbing 3-5 steps with railing: A lot  Basic Mobility - Total Score: 18                    Goals:  Encounter Problems       Encounter Problems (Active)       Mobility       STG - Patient will ambulate x 250 ft. with a w/w, Modif. I.   (Progressing)       Start:  05/16/25    Expected End:  05/30/25            STG - Patient will ascend and descend three stairs with cane and CGA. (No railing at home).   (Progressing)       Start:  05/16/25    Expected End:  05/30/25            Goal 1:  Achieve 0-95 degrees L knee.   (Progressing)       Start:  05/16/25    Expected End:  05/30/25            Goal 2: Demo 3+/5=L Quads..  G=SLR (Progressing)       Start:  05/16/25    Expected End:  " 05/30/25               PT Transfers       STG - Patient will perform bed mobility with Modif. I.   (Progressing)       Start:  05/16/25    Expected End:  05/30/25            STG - Patient will transfer sit to and from stand into a w/w with Modif. I.   (Progressing)       Start:  05/16/25    Expected End:  05/30/25               Pain - Adult          Safety       STG - Patient uses gait belt during all transfers, stairs, and w/w amb. trng..  (Progressing)       Start:  05/16/25    Expected End:  05/30/25                 Education Documentation  Handouts, taught by Aleksandr Ricardo, PT at 5/16/2025  6:18 PM.  Learner: Family, Patient  Readiness: Acceptance  Method: Explanation, Demonstration, Handout  Response: Demonstrated Understanding, Needs Reinforcement    Precautions, taught by Aleksandr Ricardo PT at 5/16/2025  6:18 PM.  Learner: Family, Patient  Readiness: Acceptance  Method: Explanation, Demonstration, Handout  Response: Demonstrated Understanding, Needs Reinforcement    Body Mechanics, taught by Aleksandr Ricardo PT at 5/16/2025  6:18 PM.  Learner: Family, Patient  Readiness: Acceptance  Method: Explanation, Demonstration, Handout  Response: Demonstrated Understanding, Needs Reinforcement    Home Exercise Program, taught by Aleksandr Ricardo PT at 5/16/2025  6:18 PM.  Learner: Family, Patient  Readiness: Acceptance  Method: Explanation, Demonstration, Handout  Response: Demonstrated Understanding, Needs Reinforcement    Mobility Training, taught by Aleksandr Ricardo PT at 5/16/2025  6:18 PM.  Learner: Family, Patient  Readiness: Acceptance  Method: Explanation, Demonstration, Handout  Response: Demonstrated Understanding, Needs Reinforcement    Education Comments  No comments found.             [1]   Patient Active Problem List  Diagnosis    Benign essential HTN    BMI 40.0-44.9, adult (Multi)    Never smoked tobacco    Bilateral leg edema    Mild concentric left ventricular hypertrophy (LVH)     Unilateral primary osteoarthritis, left knee    Total knee replacement status, left

## 2025-05-16 NOTE — ANESTHESIA PROCEDURE NOTES
"Spinal Block    Patient location during procedure: OR  Start time: 5/16/2025 10:42 AM  End time: 5/16/2025 10:50 AM  Reason for block: primary anesthetic and at surgeon's request  Staffing  Performed: OXANA and CRNA   Authorized by: Shantanu Blanco DO    Performed by: Ambrosio Montana    Preanesthetic Checklist  Completed: patient identified, IV checked, risks and benefits discussed, surgical consent, monitors and equipment checked, pre-op evaluation, timeout performed and sterile techniques followed  Block Timeout  RN/Licensed healthcare professional reads aloud to the Anesthesia provider and entire team: Patient identity, procedure with side and site, patient position, and as applicable the availability of implants/special equipment/special requirements.  Patient on coagulant treatment: no  Timeout performed at: 5/16/2025 10:35 AM  Spinal Block  Patient position: sitting  Prep: ChloraPrep  Sterility prep: cap, drape, gloves, hand hygiene and mask  Sedation level: light sedation  Patient monitoring: blood pressure, continuous pulse oximetry and heart rate  Approach: midline  Needle  Needle type: Quincke   Needle gauge: 22 G  Needle length: 5 in  Free flowing CSF: no    Assessment  Events: procedure abandoned  Procedure assessment: patient tolerated procedure well with no immediate complications  Additional Notes  1st attempt at L4/L5 vertebral space by SRNA unsuccessful.   2nd attempt at L3/L4 vertebral space by CRNA unsuccessful.    Both sites localized with 1% lidocaine. Oss encountered on both attempts. Patient kept endorsing feeling \"midline.\"   No heme/CSF noted, pt denied paresthesia and pain during attempts.   Patient had a difficult time maintaining position with frequent slouching. Curvature of the spine noted.           "

## 2025-05-16 NOTE — ANESTHESIA POSTPROCEDURE EVALUATION
Patient: Laureano Elizabeth    Procedure Summary       Date: 05/16/25 Room / Location: [facility] OR 06 / Virtual [facility] OR    Anesthesia Start: 1035 Anesthesia Stop: 1238    Procedure: LEFT KNEE ARTHROPLASTY (Left: Knee) Diagnosis:       Unilateral primary osteoarthritis, left knee      (Unilateral primary osteoarthritis, left knee [M17.12])    Surgeons: Irving Leiva MD Responsible Provider: Shantanu Blanco DO    Anesthesia Type: regional, general ASA Status: 2            Anesthesia Type: regional, general    Vitals Value Taken Time   /64 05/16/25 13:03   Temp 36 °C (96.8 °F) 05/16/25 12:38   Pulse 76 05/16/25 13:05   Resp 17 05/16/25 13:05   SpO2 97 % 05/16/25 13:05   Vitals shown include unfiled device data.    Anesthesia Post Evaluation    Patient location during evaluation: PACU  Patient participation: complete - patient participated  Level of consciousness: awake and alert  Pain management: adequate  Airway patency: patent  Cardiovascular status: acceptable  Respiratory status: acceptable  Hydration status: acceptable  Postoperative Nausea and Vomiting: none        No notable events documented.

## 2025-05-17 ENCOUNTER — HOME HEALTH ADMISSION (OUTPATIENT)
Dept: HOME HEALTH SERVICES | Facility: HOME HEALTH | Age: 60
End: 2025-05-17
Payer: COMMERCIAL

## 2025-05-17 LAB
ANION GAP SERPL CALC-SCNC: 10 MMOL/L (ref 10–20)
BUN SERPL-MCNC: 18 MG/DL (ref 6–23)
CALCIUM SERPL-MCNC: 8.1 MG/DL (ref 8.6–10.3)
CHLORIDE SERPL-SCNC: 103 MMOL/L (ref 98–107)
CO2 SERPL-SCNC: 26 MMOL/L (ref 21–32)
CREAT SERPL-MCNC: 0.86 MG/DL (ref 0.5–1.3)
EGFRCR SERPLBLD CKD-EPI 2021: >90 ML/MIN/1.73M*2
ERYTHROCYTE [DISTWIDTH] IN BLOOD BY AUTOMATED COUNT: 15.9 % (ref 11.5–14.5)
GLUCOSE SERPL-MCNC: 101 MG/DL (ref 74–99)
HCT VFR BLD AUTO: 30.7 % (ref 41–52)
HGB BLD-MCNC: 9.1 G/DL (ref 13.5–17.5)
MCH RBC QN AUTO: 25.9 PG (ref 26–34)
MCHC RBC AUTO-ENTMCNC: 29.6 G/DL (ref 32–36)
MCV RBC AUTO: 88 FL (ref 80–100)
NRBC BLD-RTO: 0 /100 WBCS (ref 0–0)
PLATELET # BLD AUTO: 225 X10*3/UL (ref 150–450)
POTASSIUM SERPL-SCNC: 4.2 MMOL/L (ref 3.5–5.3)
RBC # BLD AUTO: 3.51 X10*6/UL (ref 4.5–5.9)
SODIUM SERPL-SCNC: 135 MMOL/L (ref 136–145)
WBC # BLD AUTO: 5.7 X10*3/UL (ref 4.4–11.3)

## 2025-05-17 PROCEDURE — 36415 COLL VENOUS BLD VENIPUNCTURE: CPT | Performed by: ORTHOPAEDIC SURGERY

## 2025-05-17 PROCEDURE — 97116 GAIT TRAINING THERAPY: CPT | Mod: GP,CQ

## 2025-05-17 PROCEDURE — 80048 BASIC METABOLIC PNL TOTAL CA: CPT | Performed by: ORTHOPAEDIC SURGERY

## 2025-05-17 PROCEDURE — 7100000011 HC EXTENDED STAY RECOVERY HOURLY - NURSING UNIT

## 2025-05-17 PROCEDURE — 97165 OT EVAL LOW COMPLEX 30 MIN: CPT | Mod: GO | Performed by: OCCUPATIONAL THERAPIST

## 2025-05-17 PROCEDURE — 2500000001 HC RX 250 WO HCPCS SELF ADMINISTERED DRUGS (ALT 637 FOR MEDICARE OP): Performed by: PHYSICIAN ASSISTANT

## 2025-05-17 PROCEDURE — 97110 THERAPEUTIC EXERCISES: CPT | Mod: GP,CQ

## 2025-05-17 PROCEDURE — 2500000001 HC RX 250 WO HCPCS SELF ADMINISTERED DRUGS (ALT 637 FOR MEDICARE OP): Performed by: ORTHOPAEDIC SURGERY

## 2025-05-17 PROCEDURE — 2500000004 HC RX 250 GENERAL PHARMACY W/ HCPCS (ALT 636 FOR OP/ED): Mod: JZ | Performed by: NURSE PRACTITIONER

## 2025-05-17 PROCEDURE — 2500000004 HC RX 250 GENERAL PHARMACY W/ HCPCS (ALT 636 FOR OP/ED): Mod: JZ | Performed by: ORTHOPAEDIC SURGERY

## 2025-05-17 PROCEDURE — 85027 COMPLETE CBC AUTOMATED: CPT | Performed by: ORTHOPAEDIC SURGERY

## 2025-05-17 PROCEDURE — 2500000002 HC RX 250 W HCPCS SELF ADMINISTERED DRUGS (ALT 637 FOR MEDICARE OP, ALT 636 FOR OP/ED): Performed by: ORTHOPAEDIC SURGERY

## 2025-05-17 RX ORDER — KETOROLAC TROMETHAMINE 30 MG/ML
30 INJECTION, SOLUTION INTRAMUSCULAR; INTRAVENOUS EVERY 6 HOURS
Status: COMPLETED | OUTPATIENT
Start: 2025-05-17 | End: 2025-05-18

## 2025-05-17 RX ORDER — HYDROMORPHONE HYDROCHLORIDE 0.2 MG/ML
0.2 INJECTION INTRAMUSCULAR; INTRAVENOUS; SUBCUTANEOUS EVERY 2 HOUR PRN
Status: DISCONTINUED | OUTPATIENT
Start: 2025-05-17 | End: 2025-05-18 | Stop reason: HOSPADM

## 2025-05-17 RX ADMIN — ACETAMINOPHEN 650 MG: 325 TABLET ORAL at 18:11

## 2025-05-17 RX ADMIN — HYDROMORPHONE HYDROCHLORIDE 0.2 MG: 0.2 INJECTION, SOLUTION INTRAMUSCULAR; INTRAVENOUS; SUBCUTANEOUS at 13:58

## 2025-05-17 RX ADMIN — DOCUSATE SODIUM 100 MG: 100 CAPSULE, LIQUID FILLED ORAL at 20:35

## 2025-05-17 RX ADMIN — CYCLOBENZAPRINE 10 MG: 10 TABLET, FILM COATED ORAL at 11:11

## 2025-05-17 RX ADMIN — KETOROLAC TROMETHAMINE 30 MG: 30 INJECTION, SOLUTION INTRAMUSCULAR at 20:34

## 2025-05-17 RX ADMIN — OXYCODONE HYDROCHLORIDE 10 MG: 10 TABLET ORAL at 20:46

## 2025-05-17 RX ADMIN — KETOROLAC TROMETHAMINE 30 MG: 30 INJECTION, SOLUTION INTRAMUSCULAR at 02:21

## 2025-05-17 RX ADMIN — KETOROLAC TROMETHAMINE 30 MG: 30 INJECTION, SOLUTION INTRAMUSCULAR at 08:38

## 2025-05-17 RX ADMIN — KETOROLAC TROMETHAMINE 30 MG: 30 INJECTION, SOLUTION INTRAMUSCULAR at 14:55

## 2025-05-17 RX ADMIN — OXYCODONE HYDROCHLORIDE 10 MG: 10 TABLET ORAL at 03:53

## 2025-05-17 RX ADMIN — ACETAMINOPHEN 650 MG: 325 TABLET ORAL at 06:00

## 2025-05-17 RX ADMIN — CETIRIZINE HYDROCHLORIDE 10 MG: 10 TABLET, FILM COATED ORAL at 08:34

## 2025-05-17 RX ADMIN — MORPHINE SULFATE 2 MG: 2 INJECTION, SOLUTION INTRAMUSCULAR; INTRAVENOUS at 01:01

## 2025-05-17 RX ADMIN — ASPIRIN 81 MG: 81 TABLET, COATED ORAL at 20:35

## 2025-05-17 RX ADMIN — TRANEXAMIC ACID 1950 MG: 650 TABLET ORAL at 06:00

## 2025-05-17 RX ADMIN — OXYCODONE HYDROCHLORIDE 5 MG: 5 TABLET ORAL at 08:34

## 2025-05-17 RX ADMIN — DOCUSATE SODIUM 100 MG: 100 CAPSULE, LIQUID FILLED ORAL at 08:34

## 2025-05-17 RX ADMIN — MONTELUKAST 10 MG: 10 TABLET, FILM COATED ORAL at 20:35

## 2025-05-17 RX ADMIN — ACETAMINOPHEN 650 MG: 325 TABLET ORAL at 11:52

## 2025-05-17 RX ADMIN — OXYCODONE HYDROCHLORIDE 10 MG: 10 TABLET ORAL at 12:29

## 2025-05-17 RX ADMIN — CEFAZOLIN SODIUM 2 G: 2 INJECTION, SOLUTION INTRAVENOUS at 02:21

## 2025-05-17 RX ADMIN — OXYCODONE HYDROCHLORIDE 10 MG: 10 TABLET ORAL at 16:49

## 2025-05-17 RX ADMIN — ASPIRIN 81 MG: 81 TABLET, COATED ORAL at 08:34

## 2025-05-17 ASSESSMENT — COGNITIVE AND FUNCTIONAL STATUS - GENERAL
DRESSING REGULAR LOWER BODY CLOTHING: A LITTLE
CLIMB 3 TO 5 STEPS WITH RAILING: A LITTLE
PERSONAL GROOMING: A LITTLE
DAILY ACTIVITIY SCORE: 19
HELP NEEDED FOR BATHING: A LITTLE
TURNING FROM BACK TO SIDE WHILE IN FLAT BAD: A LITTLE
MOVING TO AND FROM BED TO CHAIR: A LITTLE
DRESSING REGULAR UPPER BODY CLOTHING: A LITTLE
MOBILITY SCORE: 19
STANDING UP FROM CHAIR USING ARMS: A LITTLE
WALKING IN HOSPITAL ROOM: A LITTLE
TOILETING: A LITTLE

## 2025-05-17 ASSESSMENT — PAIN SCALES - GENERAL
PAINLEVEL_OUTOF10: 4
PAINLEVEL_OUTOF10: 7
PAINLEVEL_OUTOF10: 7
PAINLEVEL_OUTOF10: 6
PAINLEVEL_OUTOF10: 7
PAINLEVEL_OUTOF10: 0 - NO PAIN
PAINLEVEL_OUTOF10: 5 - MODERATE PAIN
PAINLEVEL_OUTOF10: 4
PAINLEVEL_OUTOF10: 4
PAINLEVEL_OUTOF10: 5 - MODERATE PAIN
PAINLEVEL_OUTOF10: 4
PAINLEVEL_OUTOF10: 9
PAINLEVEL_OUTOF10: 7

## 2025-05-17 ASSESSMENT — PAIN - FUNCTIONAL ASSESSMENT

## 2025-05-17 ASSESSMENT — PAIN DESCRIPTION - ORIENTATION
ORIENTATION: LEFT
ORIENTATION: RIGHT
ORIENTATION: LEFT

## 2025-05-17 ASSESSMENT — PAIN DESCRIPTION - LOCATION
LOCATION: KNEE

## 2025-05-17 ASSESSMENT — PAIN DESCRIPTION - DESCRIPTORS: DESCRIPTORS: ACHING

## 2025-05-17 NOTE — PROGRESS NOTES
Occupational Therapy    Evaluation    Patient Name: Laureano Elizabeth  MRN: [id number]  Today's Date: 5/17/2025  Time Calculation  Start Time: 1025  Stop Time: 1041  Time Calculation (min): 16 min  4124/4124-A  Eval only     Assessment  IP OT Assessment  Prognosis: Good  End of Session Communication: Bedside nurse  End of Session Patient Position: Up in chair, Alarm on  Patient presents with decline in ADLs, functional transfers, functional mobility and would benefit from OT during acute stay to improve functional independence and safety. Recommend low intensity OT to maximize functional independence and safety.     Plan:  Treatment Interventions: ADL retraining, Functional transfer training, Patient/family training, Equipment evaluation/education, Compensatory technique education  OT Frequency: Daily  OT Discharge Recommendations: Low intensity level of continued care  Equipment Recommended upon Discharge: Wheeled walker (shower chair, reacher, sock aide, LH Shoe horn, LH sponge)  OT - OK to Discharge: Yes from acute care OT services to the next level of care when cleared by medical team      Subjective   Current Problem:  1. Total knee replacement status, left  Referral to Home Health      2. Unilateral primary osteoarthritis, left knee  Surgical Pathology Exam    Surgical Pathology Exam          General:  General  Reason for Referral: left TKR  Referred By: Dr Irving Leiva MD  Past Medical History Relevant to Rehab: Admitted 5/16/2025 after having left TKR completed by Dr Leiva. PMH: HTN, OAD, asthma  Prior to Session Communication: Bedside nurse  Patient Position Received: Bed, 3 rail up, Alarm on  Preferred Learning Style: verbal  General Comment: Patient returning to room with PTA and agreeable to participate in OT evaluation.    Precautions:  LE Weight Bearing Status: Weight Bearing as Tolerated  Medical Precautions: Fall precautions  Post-Surgical Precautions: Left total knee precautions    Pain:  Pain  Assessment  Pain Assessment: 0-10  0-10 (Numeric) Pain Score:  (Patient reported moderate to severe left knee pain with activity.  Patient medicated for pain prior to evaluation.)  Pain Interventions: Rest, Repositioned      Objective   Cognition:  Overall Cognitive Status: Within Functional Limits    Home Living:  Type of Home: House  Lives With: Alone (daughters to stay and assist)  Home Adaptive Equipment: Walker rolling or standard, Cane  Home Layout: Two level  Home Access: Stairs to enter without rails  Entrance Stairs-Number of Steps: 3  Bathroom Shower/Tub: Tub/shower unit, Walk-in shower  Bathroom Equipment: Grab bars in shower  Home Living Comments: stays on 1st floor.  Tub on 1st, w-in upstairs.     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    ADL:  Grooming Assistance: Stand by (standing at sink)  LE Dressing Assistance: Minimal (don underwear and shorts)  ADL Comments: educated patient on safety and comp strategies for LB dressing    Activity Tolerance:  Endurance: Endurance does not limit participation in activity    Bed Mobility/Transfers:   Bed Mobility  Bed Mobility:  (CGA sit to supine)  Transfers  Transfer:  (CGA sit <>stand with min verbal cues for safety and technique)  Educated patient on safety with car transfers and patient verbalized understanding.    Educated patient on safety and use of shower chair for safety and patient verbalized understanding. Educated patient on having [facility] assess shower safety prior to completing first shower and patient verbalized understanding.     Ambulation/Gait Training:  Functional Mobility  Functional Mobility Performed:  (CGA with  functional mobility task in room)    Extremities: RUE   RUE : Within Functional Limits and LUE   LUE: Within Functional Limits    Outcome Measures: [facility] Daily  Activity  Putting on and taking off regular lower body clothing: A little  Bathing (including washing, rinsing, drying): A little  Putting on and taking off regular upper body clothing: A little  Toileting, which includes using toilet, bedpan or urinal: A little  Taking care of personal grooming such as brushing teeth: A little  Eating Meals: None  Daily Activity - Total Score: 19    EDUCATION:  Education  Individual(s) Educated: Patient  Education Provided: Fall precautons  Patient Response to Education: Patient/Caregiver Verbalized Understanding of Information    Goals:   Encounter Problems       Encounter Problems (Active)       Dressings Lower Extremities       STG - Patient will complete lower body dressing independent with comp strategies and AE  (Progressing)       Start:  05/17/25    Expected End:  05/31/25               Functional Balance       STG-Patient will be independent with assistive device dynamic stand task >5 minutes for ADL completion   (Progressing)       Start:  05/17/25    Expected End:  05/31/25               Functional Mobility       STG-Patient will be independent with assistive device functional mobility tasks   (Progressing)       Start:  05/17/25    Expected End:  05/31/25               OT Transfers       STG-Patient will be independent with functional transfers demonstrating good safety   (Progressing)       Start:  05/17/25    Expected End:  05/31/25               OT Transfers       STG - Patient will perform car transfers independently demonstrating good safety (Progressing)       Start:  05/17/25    Expected End:  05/31/25

## 2025-05-17 NOTE — PROGRESS NOTES
Physical Therapy    Physical Therapy    Physical Therapy Treatment    Patient Name: Laureano Elizabeth  MRN: [id number]  Today's Date: 5/17/2025  Time Calculation  Start Time: 0950  Stop Time: 1028  Time Calculation (min): 38 min     4124/4124-A       05/17/25 0950   PT  Visit   PT Received On 05/17/25   Response to Previous Treatment Patient with no complaints from previous session.   General   Reason for Referral L TKR   Referred By ADENIKE Leiva   Past Medical History Relevant to Rehab HTN, O-A, LVH, asthma.   Prior to Session Communication Bedside nurse   Patient Position Received Bed, 3 rail up;Alarm on   Preferred Learning Style verbal;visual;written   General Comment Pt agreeable to therapy and cleared for participation   Precautions   LE Weight Bearing Status Weight Bearing as Tolerated   Medical Precautions Fall precautions   Post-Surgical Precautions Left total knee precautions   Precautions Comment No pivoting.   Pain Assessment   Pain Assessment 0-10   0-10 (Numeric) Pain Score 4   Pain Type Surgical pain   Pain Location Knee   Pain Orientation Left   Pain Interventions Repositioned   Cognition   Overall Cognitive Status WFL   Attention WFL   Therapeutic Exercise   Therapeutic Exercise Performed Yes   Therapeutic Exercise Activity 1 AP, QS, GS, SLR, LAQ, marching, heel slides x10-20 with emphasis on OP LE   Bed Mobility   Bed Mobility Yes   Bed Mobility 1   Bed Mobility 1 Supine to sitting   Level of Assistance 1 Close supervision   Ambulation/Gait Training   Ambulation/Gait Training Performed Yes   Ambulation/Gait Training 1   Surface 1 Level tile   Device 1 Rolling walker   Gait Support Devices Gait belt   Assistance 1 Close supervision   Quality of Gait 1 Diminished heel strike;Inconsistent stride length;Antalgic   Comments/Distance (ft) 1 60', 200' slow step to gait with emphasis on heel strike/toe off.   Transfers   Transfer Yes   Transfer 1   Transfer From 1 Sit to;Stand to   Transfer to 1 Chair with arms    Technique 1 Sit to stand;Stand to sit   Transfer Device 1 Walker;Gait belt   Transfer Level of Assistance 1 Close supervision   Trials/Comments 1 x6 multiple STS from bed, mat table and chair levels. SUP with good safety awareness   Stairs   Stairs Yes   Stairs   Rails 1 Bilateral   Support Devices 1 Gait belt   Assistance 1 Close supervision;Minimal verbal cues   Comment/Number of Steps 1 x3 min cues for sequencing and safety   Stairs 2   Rails 2 None (Comment)   Device 2 Single point cane   Support Devices 2 Gait belt   Assistance 2 Contact guard;Minimal verbal cues   Comment/Number of Steps 2 CGA On R side, SPC on L side to simulate home set up. CGA for safety. Pt is grossly steady with good safety awareness.   Activity Tolerance   Endurance Endurance does not limit participation in activity   PT Assessment   PT Assessment Results Decreased strength;Decreased range of motion;Decreased endurance;Decreased mobility;Pain   Rehab Prognosis Good   Evaluation/Treatment Tolerance Patient tolerated treatment well   End of Session Communication Bedside nurse   Assessment Comment Pt put forth good effort, grossly steady with good safety awareness   End of Session Patient Position Up in chair;Alarm on     Outcome Measures:  [facility] Basic Mobility  Turning from your back to your side while in a flat bed without using bedrails: None  Moving from lying on your back to sitting on the side of a flat bed without using bedrails: A little  Moving to and from bed to chair (including a wheelchair): A little  Standing up from a chair using your arms (e.g. wheelchair or bedside chair): A little  To walk in hospital room: A little  Climbing 3-5 steps with railing: A little  Basic Mobility - Total Score: 19                             EDUCATION:     Education Documentation  Handouts, taught by Hina Linda PTA at 5/17/2025 11:57 AM.  Learner: Patient  Readiness: Acceptance  Method: Explanation, Demonstration  Response: Verbalizes  Understanding, Demonstrated Understanding    Precautions, taught by Hina Linda PTA at 5/17/2025 11:57 AM.  Learner: Patient  Readiness: Acceptance  Method: Explanation, Demonstration  Response: Verbalizes Understanding, Demonstrated Understanding    Body Mechanics, taught by Hina Linda PTA at 5/17/2025 11:57 AM.  Learner: Patient  Readiness: Acceptance  Method: Explanation, Demonstration  Response: Verbalizes Understanding, Demonstrated Understanding    Home Exercise Program, taught by Hina Linda PTA at 5/17/2025 11:57 AM.  Learner: Patient  Readiness: Acceptance  Method: Explanation, Demonstration  Response: Verbalizes Understanding, Demonstrated Understanding    Mobility Training, taught by Hina Linda PTA at 5/17/2025 11:57 AM.  Learner: Patient  Readiness: Acceptance  Method: Explanation, Demonstration  Response: Verbalizes Understanding, Demonstrated Understanding    Education Comments  No comments found.        GOALS:  Encounter Problems       Encounter Problems (Active)       Mobility       STG - Patient will ambulate x 250 ft. with a w/w, Modif. I.   (Progressing)       Start:  05/16/25    Expected End:  05/30/25            STG - Patient will ascend and descend three stairs with cane and CGA. (No railing at home).   (Progressing)       Start:  05/16/25    Expected End:  05/30/25            Goal 1:  Achieve 0-95 degrees L knee.   (Progressing)       Start:  05/16/25    Expected End:  05/30/25            Goal 2: Demo 3+/5=L Quads..  G=SLR (Progressing)       Start:  05/16/25    Expected End:  05/30/25               PT Transfers       STG - Patient will perform bed mobility with Modif. I.   (Progressing)       Start:  05/16/25    Expected End:  05/30/25            STG - Patient will transfer sit to and from stand into a w/w with Modif. I.   (Progressing)       Start:  05/16/25    Expected End:  05/30/25               Pain - Adult          Safety       STG - Patient uses gait belt during all  transfers, stairs, and w/w amb. trng..  (Progressing)       Start:  05/16/25    Expected End:  05/30/25

## 2025-05-17 NOTE — CARE PLAN
The patient's goals for the shift include      The clinical goals for the shift include pain control    Over the shift, the patient did not make progress toward the following goals.       Problem: Pain - Adult  Goal: Verbalizes/displays adequate comfort level or baseline comfort level  Outcome: Progressing     Problem: Safety - Adult  Goal: Free from fall injury  Outcome: Progressing     Problem: Chronic Conditions and Co-morbidities  Goal: Patient's chronic conditions and co-morbidity symptoms are monitored and maintained or improved  Outcome: Progressing     Patient resting in between doses of pain medication. Able to ambulate to the bathroom to void.

## 2025-05-17 NOTE — PROGRESS NOTES
"Laureano Elizabeth is a 59 y.o. male on day 1 of admission presenting with Unilateral primary osteoarthritis, left knee.    Subjective   Mr. Elizabeth was in bed, in NAD. C/o intermittent post operative pain in the left knee.  Somewhat anxious, anticipating dealing with pain at home. Denied fever, chills, n/v , SOB/PECK, chest or abdominal pain. No issues with voiding. Progressing well with PT/OT.       Objective     Physical Exam  Constitutional:       General: He is not in acute distress.     Appearance: Normal appearance.   HENT:      Head: Normocephalic.      Mouth/Throat:      Mouth: Mucous membranes are moist.   Eyes:      Extraocular Movements: Extraocular movements intact.      Pupils: Pupils are equal, round, and reactive to light.   Cardiovascular:      Rate and Rhythm: Normal rate and regular rhythm.      Pulses: Normal pulses.      Heart sounds: Normal heart sounds.   Pulmonary:      Effort: Pulmonary effort is normal.      Breath sounds: Normal breath sounds.   Abdominal:      General: Bowel sounds are normal.      Palpations: Abdomen is soft.   Musculoskeletal:         General: Normal range of motion.      Cervical back: Normal range of motion and neck supple.      Comments: Left knee dressing dry and intact. Intact light tough sensations bilaterally. Dorsiflexion and plantar flexion intact. DP and TP pulses 2+, suymmetrical   Skin:     General: Skin is warm and dry.      Capillary Refill: Capillary refill takes less than 2 seconds.   Neurological:      General: No focal deficit present.      Mental Status: He is alert and oriented to person, place, and time.   Psychiatric:         Mood and Affect: Mood normal.         Last Recorded Vitals  Blood pressure 119/71, pulse 78, temperature 36.8 °C (98.2 °F), temperature source Temporal, resp. rate 18, height 1.778 m (5' 10\"), weight 118 kg (260 lb), SpO2 97%.  Intake/Output last 3 Shifts:  I/O last 3 completed shifts:  In: 1540 (13.1 mL/kg) [P.O.:240; IV " Piggyback:1300]  Out: 575 (4.9 mL/kg) [Urine:525 (0.1 mL/kg/hr); Blood:50]  Dosing Weight: 117.9 kg     Relevant Results      Scheduled medications  Scheduled Medications[1]  Continuous medications  Continuous Medications[2]  PRN medications  PRN Medications[3]  Results for orders placed or performed during the hospital encounter of 05/16/25 (from the past 24 hours)   CBC   Result Value Ref Range    WBC 5.7 4.4 - 11.3 x10*3/uL    nRBC 0.0 0.0 - 0.0 /100 WBCs    RBC 3.51 (L) 4.50 - 5.90 x10*6/uL    Hemoglobin 9.1 (L) 13.5 - 17.5 g/dL    Hematocrit 30.7 (L) 41.0 - 52.0 %    MCV 88 80 - 100 fL    MCH 25.9 (L) 26.0 - 34.0 pg    MCHC 29.6 (L) 32.0 - 36.0 g/dL    RDW 15.9 (H) 11.5 - 14.5 %    Platelets 225 150 - 450 x10*3/uL   Basic metabolic panel   Result Value Ref Range    Glucose 101 (H) 74 - 99 mg/dL    Sodium 135 (L) 136 - 145 mmol/L    Potassium 4.2 3.5 - 5.3 mmol/L    Chloride 103 98 - 107 mmol/L    Bicarbonate 26 21 - 32 mmol/L    Anion Gap 10 10 - 20 mmol/L    Urea Nitrogen 18 6 - 23 mg/dL    Creatinine 0.86 0.50 - 1.30 mg/dL    eGFR >90 >60 mL/min/1.73m*2    Calcium 8.1 (L) 8.6 - 10.3 mg/dL                            Assessment & Plan  Unilateral primary osteoarthritis, left knee    Total knee replacement status, left    POD #1 s/p left TKA  Continue PT/OT, WBAT left LE  Reviewed am labs  Multimodal pain regimen  knee dressing to be removed on post op day #7  VTE prophylaxis: ASA 81MG BID  When appropriate, will discharge to home  Follow up with Dr. Leiva as directed     I spent 20 minutes in the professional and overall care of this patient.      Mariah Rodrigez, APRN-CNP           [1] acetaminophen, 650 mg, oral, q6h REMBERTO  aspirin, 81 mg, oral, BID  cetirizine, 10 mg, oral, Daily  docusate sodium, 100 mg, oral, BID  montelukast, 10 mg, oral, Nightly  nebivolol, 5 mg, oral, Daily  valsartan, 80 mg, oral, Daily    [2] lactated Ringer's, 50 mL/hr, Last Rate: 50 mL/hr (05/16/25 1638)  oxygen, 2 L/min    [3] PRN  medications: benzocaine-menthol, bisacodyl, cyclobenzaprine, HYDROmorphone, morphine, naloxone, ondansetron ODT **OR** ondansetron, oxyCODONE, oxyCODONE

## 2025-05-18 ENCOUNTER — DOCUMENTATION (OUTPATIENT)
Dept: HOME HEALTH SERVICES | Facility: HOME HEALTH | Age: 60
End: 2025-05-18
Payer: COMMERCIAL

## 2025-05-18 VITALS
SYSTOLIC BLOOD PRESSURE: 130 MMHG | WEIGHT: 260 LBS | TEMPERATURE: 97.7 F | BODY MASS INDEX: 37.22 KG/M2 | HEART RATE: 78 BPM | RESPIRATION RATE: 18 BRPM | DIASTOLIC BLOOD PRESSURE: 85 MMHG | OXYGEN SATURATION: 99 % | HEIGHT: 70 IN

## 2025-05-18 PROCEDURE — 2500000001 HC RX 250 WO HCPCS SELF ADMINISTERED DRUGS (ALT 637 FOR MEDICARE OP): Performed by: ORTHOPAEDIC SURGERY

## 2025-05-18 PROCEDURE — 2500000001 HC RX 250 WO HCPCS SELF ADMINISTERED DRUGS (ALT 637 FOR MEDICARE OP): Performed by: PHYSICIAN ASSISTANT

## 2025-05-18 PROCEDURE — 99238 HOSP IP/OBS DSCHRG MGMT 30/<: CPT | Performed by: PHYSICIAN ASSISTANT

## 2025-05-18 PROCEDURE — 7100000011 HC EXTENDED STAY RECOVERY HOURLY - NURSING UNIT

## 2025-05-18 PROCEDURE — 2500000002 HC RX 250 W HCPCS SELF ADMINISTERED DRUGS (ALT 637 FOR MEDICARE OP, ALT 636 FOR OP/ED): Performed by: ORTHOPAEDIC SURGERY

## 2025-05-18 PROCEDURE — 2500000004 HC RX 250 GENERAL PHARMACY W/ HCPCS (ALT 636 FOR OP/ED): Mod: JZ | Performed by: NURSE PRACTITIONER

## 2025-05-18 PROCEDURE — 97535 SELF CARE MNGMENT TRAINING: CPT | Mod: GO,CO

## 2025-05-18 PROCEDURE — 97110 THERAPEUTIC EXERCISES: CPT | Mod: GP

## 2025-05-18 RX ORDER — CYCLOBENZAPRINE HCL 10 MG
10 TABLET ORAL 3 TIMES DAILY PRN
Qty: 20 TABLET | Refills: 0 | Status: SHIPPED | OUTPATIENT
Start: 2025-05-18

## 2025-05-18 RX ORDER — DOCUSATE SODIUM 100 MG/1
100 CAPSULE, LIQUID FILLED ORAL 2 TIMES DAILY
Qty: 30 CAPSULE | Refills: 1 | Status: SHIPPED | OUTPATIENT
Start: 2025-05-18

## 2025-05-18 RX ORDER — OXYCODONE HYDROCHLORIDE 5 MG/1
5 TABLET ORAL EVERY 4 HOURS PRN
Qty: 28 TABLET | Refills: 0 | Status: SHIPPED | OUTPATIENT
Start: 2025-05-18

## 2025-05-18 RX ORDER — ASPIRIN 81 MG/1
81 TABLET ORAL 2 TIMES DAILY
Qty: 60 TABLET | Refills: 0 | Status: SHIPPED | OUTPATIENT
Start: 2025-05-18 | End: 2025-06-17

## 2025-05-18 RX ADMIN — ASPIRIN 81 MG: 81 TABLET, COATED ORAL at 08:33

## 2025-05-18 RX ADMIN — ACETAMINOPHEN 650 MG: 325 TABLET ORAL at 05:15

## 2025-05-18 RX ADMIN — OXYCODONE HYDROCHLORIDE 10 MG: 10 TABLET ORAL at 00:54

## 2025-05-18 RX ADMIN — VALSARTAN 80 MG: 160 TABLET, FILM COATED ORAL at 08:34

## 2025-05-18 RX ADMIN — KETOROLAC TROMETHAMINE 30 MG: 30 INJECTION, SOLUTION INTRAMUSCULAR at 09:26

## 2025-05-18 RX ADMIN — ACETAMINOPHEN 650 MG: 325 TABLET ORAL at 00:06

## 2025-05-18 RX ADMIN — CETIRIZINE HYDROCHLORIDE 10 MG: 10 TABLET, FILM COATED ORAL at 08:34

## 2025-05-18 RX ADMIN — KETOROLAC TROMETHAMINE 30 MG: 30 INJECTION, SOLUTION INTRAMUSCULAR at 02:50

## 2025-05-18 RX ADMIN — OXYCODONE HYDROCHLORIDE 10 MG: 10 TABLET ORAL at 05:14

## 2025-05-18 RX ADMIN — OXYCODONE HYDROCHLORIDE 5 MG: 5 TABLET ORAL at 10:58

## 2025-05-18 RX ADMIN — NEBIVOLOL 5 MG: 2.5 TABLET ORAL at 08:33

## 2025-05-18 RX ADMIN — DOCUSATE SODIUM 100 MG: 100 CAPSULE, LIQUID FILLED ORAL at 08:34

## 2025-05-18 ASSESSMENT — PAIN SCALES - GENERAL
PAINLEVEL_OUTOF10: 3
PAINLEVEL_OUTOF10: 4
PAINLEVEL_OUTOF10: 5 - MODERATE PAIN
PAINLEVEL_OUTOF10: 3
PAINLEVEL_OUTOF10: 4
PAINLEVEL_OUTOF10: 7
PAINLEVEL_OUTOF10: 7
PAINLEVEL_OUTOF10: 5 - MODERATE PAIN
PAINLEVEL_OUTOF10: 7

## 2025-05-18 ASSESSMENT — COGNITIVE AND FUNCTIONAL STATUS - GENERAL
HELP NEEDED FOR BATHING: A LITTLE
MOVING TO AND FROM BED TO CHAIR: A LITTLE
PERSONAL GROOMING: A LITTLE
WALKING IN HOSPITAL ROOM: A LITTLE
DRESSING REGULAR LOWER BODY CLOTHING: A LITTLE
MOBILITY SCORE: 19
TURNING FROM BACK TO SIDE WHILE IN FLAT BAD: A LITTLE
TOILETING: A LITTLE
STANDING UP FROM CHAIR USING ARMS: A LITTLE
DAILY ACTIVITIY SCORE: 19
CLIMB 3 TO 5 STEPS WITH RAILING: A LITTLE
DRESSING REGULAR UPPER BODY CLOTHING: A LITTLE

## 2025-05-18 ASSESSMENT — PAIN - FUNCTIONAL ASSESSMENT
PAIN_FUNCTIONAL_ASSESSMENT: 0-10

## 2025-05-18 ASSESSMENT — PAIN DESCRIPTION - ORIENTATION
ORIENTATION: LEFT

## 2025-05-18 ASSESSMENT — ACTIVITIES OF DAILY LIVING (ADL)
HOME_MANAGEMENT_TIME_ENTRY: 32
LACK_OF_TRANSPORTATION: NO

## 2025-05-18 ASSESSMENT — PAIN DESCRIPTION - LOCATION
LOCATION: KNEE

## 2025-05-18 NOTE — PROGRESS NOTES
"Laureano Elizabeth is a 59 y.o. male on day 2 of admission presenting with Unilateral primary osteoarthritis, left knee.    Subjective   Mr. Elizabeth was in bed, in NAD. C/o intermittent post operative pain in the left knee.  Somewhat anxious, anticipating dealing with pain at home. Denied fever, chills, n/v , SOB/PECK, chest or abdominal pain. No issues with voiding. Progressing well with PT/OT.       Objective     Physical Exam  Constitutional:       General: He is not in acute distress.     Appearance: Normal appearance.   HENT:      Head: Normocephalic.      Mouth/Throat:      Mouth: Mucous membranes are moist.   Eyes:      Extraocular Movements: Extraocular movements intact.      Pupils: Pupils are equal, round, and reactive to light.   Cardiovascular:      Rate and Rhythm: Normal rate and regular rhythm.      Pulses: Normal pulses.      Heart sounds: Normal heart sounds.   Pulmonary:      Effort: Pulmonary effort is normal.      Breath sounds: Normal breath sounds.   Abdominal:      General: Bowel sounds are normal.      Palpations: Abdomen is soft.   Musculoskeletal:         General: Normal range of motion.      Cervical back: Normal range of motion and neck supple.      Comments: Left knee dressing dry and intact. Intact light tough sensations bilaterally. Dorsiflexion and plantar flexion intact. DP and TP pulses 2+, suymmetrical   Skin:     General: Skin is warm and dry.      Capillary Refill: Capillary refill takes less than 2 seconds.   Neurological:      General: No focal deficit present.      Mental Status: He is alert and oriented to person, place, and time.   Psychiatric:         Mood and Affect: Mood normal.         Last Recorded Vitals  Blood pressure 133/78, pulse 64, temperature 36.2 °C (97.2 °F), temperature source Temporal, resp. rate 20, height 1.778 m (5' 10\"), weight 118 kg (260 lb), SpO2 97%.  Intake/Output last 3 Shifts:  I/O last 3 completed shifts:  In: 1308.3 (11.1 mL/kg) [P.O.:240; " I.V.:868.3 (7.4 mL/kg); IV Piggyback:200]  Out: 2075 (17.6 mL/kg) [Urine:2075 (0.5 mL/kg/hr)]  Dosing Weight: 117.9 kg     Relevant Results      Scheduled medications  Scheduled Medications[1]  Continuous medications  Continuous Medications[2]  PRN medications  PRN Medications[3]  No results found for this or any previous visit (from the past 24 hours).                           Assessment & Plan  Unilateral primary osteoarthritis, left knee    Total knee replacement status, left    POD #2 s/p left TKA  Continue PT/OT, WBAT left LE  Reviewed am labs  Multimodal pain regimen  knee dressing to be removed on post op day #7  VTE prophylaxis: ASA 81MG BID  Discharge home today if cleared by therapy  Follow up with Dr. Leiva as directed     Garret Vela MD           [1] acetaminophen, 650 mg, oral, q6h REMBERTO  aspirin, 81 mg, oral, BID  cetirizine, 10 mg, oral, Daily  docusate sodium, 100 mg, oral, BID  ketorolac, 30 mg, intravenous, q6h  montelukast, 10 mg, oral, Nightly  nebivolol, 5 mg, oral, Daily  valsartan, 80 mg, oral, Daily     [2] oxygen, 2 L/min     [3] PRN medications: benzocaine-menthol, bisacodyl, cyclobenzaprine, HYDROmorphone, morphine, naloxone, ondansetron ODT **OR** ondansetron, oxyCODONE, oxyCODONE

## 2025-05-18 NOTE — PROGRESS NOTES
Occupational Therapy    OT Treatment    Patient Name: Laureano Elizabeth  MRN: [id number]  Today's Date: 5/18/2025  Time Calculation  Start Time: 1027  Stop Time: 1059  Time Calculation (min): 32 min       4124/4124-A    Assessment:  End of Session Communication: Bedside nurse  End of Session Patient Position: Bed, 3 rail up, Alarm off, not on at start of session       Plan:  OT Frequency: Daily  OT Discharge Recommendations: Low intensity level of continued care  Equipment Recommended upon Discharge: Wheeled walker (tub transfer bench, reacher, sock aide, LH sponge/shoe horn)     Subjective      05/18/25 1027   OT Last Visit   OT Received On 05/18/25   General   Reason for Referral left TKR   Referred By Dr Irving Leiva MD   Past Medical History Relevant to Rehab Admitted 5/16/2025 after having left TKR completed by Dr Leiva. PMH: HTN, OAD, asthma   Prior to Session Communication Bedside nurse   Patient Position Received Bed, 3 rail up  (sitting EOB)   General Comment Pt sitting EOB, needing to use restroom and agreeable to OT tx; cleared for participation.   Precautions   LE Weight Bearing Status Weight Bearing as Tolerated   Medical Precautions Fall precautions   Post-Surgical Precautions Left total knee precautions   Precautions Comment No pivoting.   Pain Assessment   Pain Assessment 0-10   0-10 (Numeric) Pain Score 3   Pain Type Surgical pain   Pain Location Knee   Pain Orientation Left   Pain Interventions Distraction;Rest;Cold applied   Response to Interventions Content/relaxed   Cognition   Overall Cognitive Status WFL   Impulsive WFL   Grooming   Grooming Level of Assistance Close supervision   Grooming Where Assessed Standing sinkside   Grooming Comments Pt completed various grooming tasks following education in proper AD placement and safety in stance at sink. Demo's follow through of instrucion, completes oral hygiene, hand washing with SBA and occasional UE support on counter.   UE Dressing   UE Dressing  Level of Assistance Setup   UE Dressing Where Assessed Edge of bed   UE Dressing Comments Educated in benefits of seated UB dressing routine with verbal understanding and follow through. Pt doffed gown, donned pullover t-shirt with follow through of education.   LE Dressing   LE Dressing No  (pt verbalized understanding of shorts donned yesterday with comp technique and declined to trial. Pt declined to trial doffing/donning of socks with AE, reports plan to wear slippers and daughters available to assist as needed. AE demo'd for doffing/donning socks)   Toileting   Toileting Level of Assistance Close supervision   Where Assessed Toilet   Toileting Comments Pt completed toileting task via standing at toilet, cues for fww placement over toilet with follow through.   Bed Mobility 1   Bed Mobility 1 Supine to sitting   Level of Assistance 1 Modified independent   Bed Mobility Comments 1 Pt completed sup>sit with HOB partially elevated.   Bed Mobility 2   Bed Mobility  2 Sitting to supine   Level of Assistance 2 Distant supervision   Bed Mobility Comments 2 Pt opted to return to bed versus sit in chair. Pt reports having adjustable bed at home.   Transfer 1   Transfer From 1 Bed to   Transfer to 1 Stand;Sit   Technique 1 Sit to stand;Stand to sit   Transfer Device 1 Walker;Gait belt   Transfer Level of Assistance 1 Close supervision   Trials/Comments 1 STS functional transfers at EOB level, demo's good safety awareness   Toilet Transfers   Toilet Transfer From Bed   Toilet Transfer Type To and from   Toilet Transfer to Standard toilet   Toilet Transfer Technique Ambulating   Toilet Transfers   (SBA with fww)   Toilet Transfers Comments Pt ambulates within room, to/from bathroom, at fww level with SBA, min cues for turning.   Tub Transfers   Tub Transfers Comments Pt plans to use tub as it is on the first floor. Educated in benefits of tub transfer bench with pt very receptive. Educated in options to obtain with verbal  understanding. Provided written information on AE/DME handout.   Car Transfers   Car Transfers Comments Educated in car transfer safety and comp techniuqes with verbal understanding.   IP OT Assessment   End of Session Communication Bedside nurse   End of Session Patient Position Bed, 3 rail up;Alarm off, not on at start of session   Inpatient Plan   OT Frequency Daily   OT Discharge Recommendations Low intensity level of continued care   Equipment Recommended upon Discharge Wheeled walker  (tub transfer bench, reacher, sock aide, LH sponge/shoe horn)     Outcome Measures:[facility] Daily Activity  Putting on and taking off regular lower body clothing: A little  Bathing (including washing, rinsing, drying): A little  Putting on and taking off regular upper body clothing: A little  Toileting, which includes using toilet, bedpan or urinal: A little  Taking care of personal grooming such as brushing teeth: A little  Eating Meals: None  Daily Activity - Total Score: 19  Education Documentation  No documentation found.  Education Comments  No comments found.            Goals:  Encounter Problems       Encounter Problems (Active)       Dressings Lower Extremities       STG - Patient will complete lower body dressing independent with comp strategies and AE  (Progressing)       Start:  05/17/25    Expected End:  05/31/25               Functional Balance       STG-Patient will be independent with assistive device dynamic stand task >5 minutes for ADL completion   (Progressing)       Start:  05/17/25    Expected End:  05/31/25               Functional Mobility       STG-Patient will be independent with assistive device functional mobility tasks   (Progressing)       Start:  05/17/25    Expected End:  05/31/25               OT Transfers       STG-Patient will be independent with functional transfers demonstrating good safety   (Progressing)       Start:  05/17/25    Expected End:  05/31/25               OT Transfers       STG -  Patient will perform car transfers independently demonstrating good safety (Progressing)       Start:  05/17/25    Expected End:  05/31/25

## 2025-05-18 NOTE — CARE PLAN
The patient's goals for the shift include      The clinical goals for the shift include pain willbe managed    Over the shift, the patient did  make progress toward the following goals.     Patient continues to take the 10 mg of Oxycodone for 7/10 pain to left knee. He does rest in long intervals in between doses. States he will try to take 5 mg today in preparation for discharge.     Problem: Pain - Adult  Goal: Verbalizes/displays adequate comfort level or baseline comfort level  5/18/2025 0537 by Azucena Rodriguez RN  Outcome: Progressing  5/18/2025 0534 by Azucena Rodriguez RN  Outcome: Progressing     Problem: Safety - Adult  Goal: Free from fall injury  5/18/2025 0537 by Azucena Rodriguez RN  Outcome: Progressing  5/18/2025 0534 by Azucena Rodriguez RN  Outcome: Progressing     Problem: Discharge Planning  Goal: Discharge to home or other facility with appropriate resources  5/18/2025 0537 by Azucena Rodriguez RN  Outcome: Progressing  5/18/2025 0534 by Azucena Rodriguez RN  Outcome: Progressing     Problem: Chronic Conditions and Co-morbidities  Goal: Patient's chronic conditions and co-morbidity symptoms are monitored and maintained or improved  5/18/2025 0537 by Azucena Rodriguez RN  Outcome: Progressing  5/18/2025 0534 by Azucena Rodriguez RN  Outcome: Progressing     Problem: Nutrition  Goal: Nutrient intake appropriate for maintaining nutritional needs  5/18/2025 0537 by Azucena Rodriguez RN  Outcome: Progressing  5/18/2025 0534 by Azucena Rodriguez RN  Outcome: Progressing

## 2025-05-18 NOTE — PROGRESS NOTES
Physical Therapy    Physical Therapy Treatment    Patient Name: Laureano Elizabeth  MRN: [id number]  Department:   Room: [address] [place] [address]  Today's Date: 5/18/2025  Time Calculation  Start Time: 1055  Stop Time: 1130  Time Calculation (min): 35 min         Assessment/Plan         PT Plan  Treatment/Interventions: Bed mobility, Transfer training, Gait training, Stair training, Strengthening, Endurance training, Range of motion, Therapeutic exercise, Therapeutic activity, Home exercise program (Issued a handout and instructed in a/p, qs, and gs exercises, to work on tonight.)  PT Plan: Ongoing PT  PT Frequency: BID  PT Discharge Recommendations: Low intensity level of continued care  Equipment Recommended upon Discharge: Wheeled walker, Straight cane  PT Recommended Transfer Status: Contact guard, Assist x1  PT - OK to Discharge: Yes (When medically allowed.)    PT Visit Info:        General Visit Information:   General  Reason for Referral: L TKR  Referred By: Irving Leiva MD  Past Medical History Relevant to Rehab: asthma, HTN, DJD  Prior to Session Communication: Bedside nurse  Patient Position Received: Bed, 2 rail up  General Comment: Pt cooperative and pleasant; requested shorter walking distance since last therapy session, he thinks he walked too far because he had increased L knee pain after. Declined stairs today.    Subjective   Precautions:  Precautions  LE Weight Bearing Status: Weight Bearing as Tolerated  Medical Precautions: Fall precautions  Post-Surgical Precautions: Left total knee precautions  Precautions Comment: No pivoting            Objective   Pain:  Pain Assessment  Pain Assessment: 0-10  0-10 (Numeric) Pain Score: 4  Pain Type: Surgical pain  Pain Location: Knee  Pain Orientation: Left  Pain Interventions: Repositioned, Rest, Distraction  Response to Interventions: Content/relaxed  Cognition:  Cognition  Overall Cognitive Status: Within Functional Limits  Coordination:     Postural Control:      Extremity/Trunk Assessments:           AROM LLE (degrees)  L Knee Flexion 0-130: 90  L Knee Extension 0-130: -10  Activity Tolerance:     Treatments:  Therapeutic Exercise  Therapeutic Exercise Activity 1: Supine: AP's, heel slides, GS, QS X 20 reps, SLR (requested to sit up after 2 reps); Seated EOM: LAQ's X 20 reps; verbal and visual cues for correct technique to achieve optimal benefit.    Bed Mobility 1  Bed Mobility 1: Supine to sitting  Level of Assistance 1: Modified independent  Bed Mobility Comments 1: HOB partially elevated  Bed Mobility 2  Bed Mobility  2: Sitting to supine  Level of Assistance 2:  (SBA)  Bed Mobility Comments 2: HOB flat    Ambulation/Gait Training 1  Surface 1: Level tile  Device 1: Rolling walker  Gait Support Devices: Gait belt  Assistance 1:  (SBA)  Quality of Gait 1: Decreased step length, Antalgic (decreased stance L LE; progressed from step-to gait pattern to partial step through gait with FWW)  Comments/Distance (ft) 1: 50 ft X 2  Transfer 1  Transfer From 1: Sit to  Transfer to 1: Stand  Technique 1: Sit to stand, Stand to sit  Transfer Device 1: Walker, Gait belt  Transfer Level of Assistance 1:  (SBA)  Trials/Comments 1: demo's good safety with FWW    Outcome Measures:  [facility] Basic Mobility  Turning from your back to your side while in a flat bed without using bedrails: None  Moving from lying on your back to sitting on the side of a flat bed without using bedrails: A little  Moving to and from bed to chair (including a wheelchair): A little  Standing up from a chair using your arms (e.g. wheelchair or bedside chair): A little  To walk in hospital room: A little  Climbing 3-5 steps with railing: A little  Basic Mobility - Total Score: 19    Education Documentation  No documentation found.  Education Comments  No comments found.        OP EDUCATION:       Encounter Problems       Encounter Problems (Resolved)       Mobility       STG - Patient will ambulate x 250 ft. with a  w/w, Modif. I.   (Adequate for Discharge)       Start:  05/16/25    Expected End:  05/30/25    Resolved:  05/18/25         STG - Patient will ascend and descend three stairs with cane and CGA. (No railing at home).   (Adequate for Discharge)       Start:  05/16/25    Expected End:  05/30/25    Resolved:  05/18/25         Goal 1:  Achieve 0-95 degrees L knee.   (Adequate for Discharge)       Start:  05/16/25    Expected End:  05/30/25    Resolved:  05/18/25         Goal 2: Demo 3+/5=L Quads..  G=SLR (Adequate for Discharge)       Start:  05/16/25    Expected End:  05/30/25    Resolved:  05/18/25            PT Transfers       STG - Patient will perform bed mobility with Modif. I.   (Adequate for Discharge)       Start:  05/16/25    Expected End:  05/30/25    Resolved:  05/18/25         STG - Patient will transfer sit to and from stand into a w/w with Modif. I.   (Adequate for Discharge)       Start:  05/16/25    Expected End:  05/30/25    Resolved:  05/18/25            Pain - Adult          Safety       STG - Patient uses gait belt during all transfers, stairs, and w/w amb. trng..  (Adequate for Discharge)       Start:  05/16/25    Expected End:  05/30/25    Resolved:  05/18/25

## 2025-05-18 NOTE — CARE PLAN
The patient's goals for the shift include      The clinical goals for the shift include Pt will remain HDS this shift.      Problem: Pain - Adult  Goal: Verbalizes/displays adequate comfort level or baseline comfort level  Outcome: Adequate for Discharge     Problem: Safety - Adult  Goal: Free from fall injury  Outcome: Adequate for Discharge     Problem: Discharge Planning  Goal: Discharge to home or other facility with appropriate resources  Outcome: Adequate for Discharge     Pt was safely discharged to home.  Pt remained stable.

## 2025-05-18 NOTE — HH CARE COORDINATION
Home Care received a Referral for Physical Therapy and Occupational Therapy. We have processed the referral for a Start of Care on 24hrs.     If you have any questions or concerns, please feel free to contact us at [phone]. Follow the prompts, enter your five digit zip code, and you will be directed to your care team on [facility] 2.

## 2025-05-18 NOTE — PROGRESS NOTES
05/18/25 0932   Discharge Planning   Living Arrangements Alone   Support Systems Children;Family members   Assistance Needed none   Type of Residence Private residence   Number of Stairs to Enter Residence 3   Number of Stairs Within Residence 0   Home or Post Acute Services In home services   Type of Home Care Services Home OT;Home PT   Expected Discharge Disposition Home H   Does the patient need discharge transport arranged? No   Financial Resource Strain   How hard is it for you to pay for the very basics like food, housing, medical care, and heating? Not hard   Housing Stability   In the last 12 months, was there a time when you were not able to pay the mortgage or rent on time? N   At any time in the past 12 months, were you homeless or living in a shelter (including now)? N   Transportation Needs   In the past 12 months, has lack of transportation kept you from medical appointments or from getting medications? no   In the past 12 months, has lack of transportation kept you from meetings, work, or from getting things needed for daily living? No   Intensity of Service   Intensity of Service 0-30 min     Spoke to patient to explain my role in discharge planning. Patient states he lives at home alone, has family near by to help. PCP is Cynthia Mendez. He has a walker and cane at home if needed. Uses [facility] in [place] for prescriptions. Spoke to patient regarding therapy recommendations and he is agreeable to [facility] and would prefer [facility]. Message sent to [facility] intake nurse to inform her patient will d/c home today. Patient states his daughter is able to drive him home.

## 2025-05-19 ENCOUNTER — HOME CARE VISIT (OUTPATIENT)
Dept: HOME HEALTH SERVICES | Facility: HOME HEALTH | Age: 60
End: 2025-05-19
Payer: COMMERCIAL

## 2025-05-19 VITALS — HEIGHT: 70 IN | WEIGHT: 260 LBS | BODY MASS INDEX: 37.22 KG/M2

## 2025-05-19 PROCEDURE — G0151 HHCP-SERV OF PT,EA 15 MIN: HCPCS

## 2025-05-19 PROCEDURE — 0023 HH SOC

## 2025-05-19 ASSESSMENT — ENCOUNTER SYMPTOMS
HIGHEST PAIN SEVERITY IN PAST 24 HOURS: 4/10
PAIN: 1
PAIN LOCATION - PAIN DURATION: CONSTANT
PAIN LOCATION - EXACERBATING FACTORS: ROM, EXERCISE, TRANSITION MOVEMENTS
PAIN LOCATION - PAIN QUALITY: ACHING, SHARP AT TIMES
LOWEST PAIN SEVERITY IN PAST 24 HOURS: 1/10
PAIN SEVERITY GOAL: 0/10
PAIN LOCATION: LEFT KNEE
PAIN LOCATION - PAIN SEVERITY: 4/10
PAIN LOCATION - PAIN FREQUENCY: CONSTANT
PERSON REPORTING PAIN: PATIENT
PAIN LOCATION - RELIEVING FACTORS: REST, ICE, MEDS

## 2025-05-19 NOTE — DISCHARGE SUMMARY
Discharge summary  Discharge Diagnosis  Unilateral primary osteoarthritis, left knee    This patient Laureano Elizabeth was admitted to the hospital on 5/16/2025  after undergoing Procedure(s) (LRB):  LEFT KNEE ARTHROPLASTY (Left) without complications.      No significant or unexpected findings or abnormal ortho imaging were noted during the hospital stay    Hospital course      Patient tolerated surgical procedure well and there was no complications. Patient progressed adequately through their recovery during hospital stay including PT and rehabilitation.    Patient was then D/C on 5/18/2025 to home  in stable condition.  Patient was instructed on the use of pain medications, the signs and symptoms of infection, and was given our number to call should they have any questions or concerns following discharge.    Based on my clinical judgment, the patient was provided with a 7-day prescription for opioid medication at 30 MED, indicated for treatment of acute pain in the setting of recent left TKA. OARRS report was run and has demonstrated an appropriate time course.  The patient has been provided with counseling pertaining to safe use of opioid medication.    Pertinent Physical Exam At Time of Discharge  Alert, oriented x 3, cooperative with examination.     Examination of the left lower  extremity reveals left knee  dressing is intact without drainage.  No liz-incisional ecchymosis.  EHL, plantarflexion, dorsiflexion are 4+/5.  Able to isometrically fire left  quadriceps.  Sensory intact light touch in the deep/superficial/common peroneal, saphenous, tibial, sural nerve distributions.  DP and popliteal pulses are 2+ with capillary refill less than 2 seconds.  Negative Homans' sign.      Home Medications     Medication List      START taking these medications     aspirin 81 mg EC tablet; Take 1 tablet (81 mg) by mouth 2 times a day.   cyclobenzaprine 10 mg tablet; Commonly known as: Flexeril; Take 1 tablet   (10  mg) by mouth 3 times a day as needed for muscle spasms.   docusate sodium 100 mg capsule; Commonly known as: Colace; Take 1   capsule (100 mg) by mouth 2 times a day.   furosemide 20 mg tablet; Commonly known as: Lasix; Take two tabs. One   day alternating with one tab. The next   metoprolol succinate XL 25 mg 24 hr tablet; Commonly known as:   Toprol-XL; Take 0.5 tablets (12.5 mg) by mouth once daily. Do not crush or   chew.   oxyCODONE 5 mg immediate release tablet; Commonly known as: Roxicodone;   Take 1 tablet (5 mg) by mouth every 4 hours if needed for moderate pain (4   - 6).     CONTINUE taking these medications     chlorhexidine 0.12 % solution; Commonly known as: Peridex; 15   milliliter(s) orally once a day for 2 doses 15 ml  the night before   surgery and 15 ml morning of surgery - swish for 30 seconds -DO NOT   SWALLOW, SPIT OUT   fexofenadine 180 mg tablet; Commonly known as: Allegra   meloxicam 15 mg tablet; Commonly known as: Mobic   montelukast 10 mg tablet; Commonly known as: Singulair   nebivolol 5 mg tablet; Commonly known as: Bystolic   valsartan 80 mg tablet; Commonly known as: Diovan           Patient may bear weight as tolerated to operative extremity with use of walker for assistance with ambulation   Surgical dressing to be removed pod7 and incision left open to air  Aspirin 81mg BiD for DVT prophylaxis started on 5/17/25 and to be taken for 30 days  Follow up with surgeon in 2 weeks      Outpatient Follow-Up  Future Appointments   Date Time Provider Department Center   5/19/2025 11:30 AM Myra Putnam PT [facility]   5/20/2025 To Be Determined Ernestina Worrell, ROSELIA [facility]   6/2/2025  2:30 PM SAM Starks [place]   6/3/2025 10:00 AM Irving Leiva MD [id number] [place]   6/5/2025  1:00 PM SAM Starks [place]   6/9/2025  1:45 PM SAM Starks [place]   6/12/2025  1:00 PM SAM Starks [place]   6/19/2025  9:30 AM SAM Starks [place]   6/23/2025   9:30 AM SAM Starks PA-C

## 2025-05-20 ENCOUNTER — HOME CARE VISIT (OUTPATIENT)
Dept: HOME HEALTH SERVICES | Facility: HOME HEALTH | Age: 60
End: 2025-05-20
Payer: COMMERCIAL

## 2025-05-20 VITALS
RESPIRATION RATE: 18 BRPM | TEMPERATURE: 98.3 F | SYSTOLIC BLOOD PRESSURE: 124 MMHG | OXYGEN SATURATION: 96 % | HEART RATE: 80 BPM | DIASTOLIC BLOOD PRESSURE: 64 MMHG

## 2025-05-20 PROCEDURE — G0152 HHCP-SERV OF OT,EA 15 MIN: HCPCS

## 2025-05-20 SDOH — ECONOMIC STABILITY: HOUSING INSECURITY
HOME SAFETY: LY STAYING TO ASSIST WITH IADLS. PATIENT SPONGE BATHING WITH SUP, REQUIRING MIN A TO DONN COMPRESSION SOCKS AND SOCKS ONLY.   EDUCATED PATIENT IN COMPENSATORY TECHNIQUES FOR LB DRESSING, PROPER SET UP AND ADJUSTMENT OF TUB TRANSFER BENCH, INCISION AN

## 2025-05-20 SDOH — ECONOMIC STABILITY: HOUSING INSECURITY: HOME SAFETY: OT SERVICES AT THIS TIME. ALL EDUCATION AND INSTRUCTION COMPLETED AT TIME OF ASSESSMENT.

## 2025-05-20 SDOH — ECONOMIC STABILITY: HOUSING INSECURITY
HOME SAFETY: D BANDAGE CARE WITH ADLS, HOME SAFETY AND FALL PREVENTION, IMPORTANCE OF KEEPING PATHWAYS CLEAR AND REMOVING UNNECESSARY THROW RUGS.   PATIENT PERFORMED CHAIR, BED, TOILET AND TUB TRANSFER BENCH TRANSFERS WITH SUP. PATIENT DENIES THE NEED FOR FURTHER

## 2025-05-20 SDOH — ECONOMIC STABILITY: HOUSING INSECURITY
HOME SAFETY: 60 YO MALE S/P L TKR, WBAT.   PLOF: LIVES ALONE 2SH (LAUNDRY IN BASEMENT), 3STE, 1ST FLOOR SET UP, INDEP ADLS USING SPC, WORKING, DRIVING  FOLLOWING SURGERY, PATIENT USING WHEELED WALKER WITH SUP FOR TRANSFERS AND MOBILITY. PATIENT CURRENTLY HAS FAMI

## 2025-05-20 ASSESSMENT — ACTIVITIES OF DAILY LIVING (ADL)
TOILETING: 1
CURRENT_FUNCTION: SUPERVISION
TOILETING: SUPERVISION
BATHING_CURRENT_FUNCTION: SUPERVISION
AMBULATION ASSISTANCE: 1
GROOMING_CURRENT_FUNCTION: SUPERVISION
PHYSICAL TRANSFERS ASSESSED: 1
TOILETING EQUIPMENT USED: TOILET RISER
GROOMING ASSESSED: 1
DRESSING_LB_CURRENT_FUNCTION: MINIMUM ASSIST
DRESSING_UB_CURRENT_FUNCTION: SUPERVISION
AMBULATION ASSISTANCE: SUPERVISION
BATHING ASSESSED: 1

## 2025-05-20 ASSESSMENT — ENCOUNTER SYMPTOMS
PAIN LOCATION - PAIN FREQUENCY: CONSTANT
PAIN LOCATION - PAIN DURATION: SINCE SURGERY
PAIN: 1
PAIN SEVERITY GOAL: 0/10
PAIN LOCATION - EXACERBATING FACTORS: EXERCISE, WALKING
HIGHEST PAIN SEVERITY IN PAST 24 HOURS: 4/10
PAIN LOCATION - PAIN SEVERITY: 4/10
PERSON REPORTING PAIN: PATIENT
LOWEST PAIN SEVERITY IN PAST 24 HOURS: 3/10
PAIN LOCATION - PAIN QUALITY: THROBBING
PAIN LOCATION - RELIEVING FACTORS: MEDICATION, ICE
PAIN LOCATION: LEFT KNEE
SUBJECTIVE PAIN PROGRESSION: GRADUALLY IMPROVING

## 2025-05-21 ENCOUNTER — HOME CARE VISIT (OUTPATIENT)
Dept: HOME HEALTH SERVICES | Facility: HOME HEALTH | Age: 60
End: 2025-05-21
Payer: COMMERCIAL

## 2025-05-21 PROCEDURE — G0151 HHCP-SERV OF PT,EA 15 MIN: HCPCS

## 2025-05-21 SDOH — HEALTH STABILITY: PHYSICAL HEALTH: EXERCISE ACTIVITY: HEEL/TOE RAISE

## 2025-05-21 SDOH — HEALTH STABILITY: PHYSICAL HEALTH: PHYSICAL EXERCISE: 10

## 2025-05-21 SDOH — HEALTH STABILITY: PHYSICAL HEALTH: EXERCISE TYPE: SUPINE, SEATED AND STANDING TKR EX'S  - WRITTEN HEP PROVIDED

## 2025-05-21 SDOH — HEALTH STABILITY: PHYSICAL HEALTH: PHYSICAL EXERCISE: 20

## 2025-05-21 SDOH — HEALTH STABILITY: PHYSICAL HEALTH: EXERCISE ACTIVITY: ANKLE PUMP

## 2025-05-21 SDOH — HEALTH STABILITY: PHYSICAL HEALTH: PHYSICAL EXERCISE: STANDING

## 2025-05-21 SDOH — HEALTH STABILITY: PHYSICAL HEALTH: EXERCISE ACTIVITIES SETS: 1

## 2025-05-21 SDOH — HEALTH STABILITY: PHYSICAL HEALTH: EXERCISE ACTIVITY: SLR

## 2025-05-21 SDOH — HEALTH STABILITY: PHYSICAL HEALTH: PHYSICAL EXERCISE: SUPINE

## 2025-05-21 SDOH — HEALTH STABILITY: PHYSICAL HEALTH

## 2025-05-21 SDOH — HEALTH STABILITY: PHYSICAL HEALTH: EXERCISE ACTIVITY: KNEE FLEXION

## 2025-05-21 SDOH — HEALTH STABILITY: PHYSICAL HEALTH: EXERCISE ACTIVITY: HIP ABDUCTION

## 2025-05-21 SDOH — HEALTH STABILITY: PHYSICAL HEALTH: PHYSICAL EXERCISE: SEATED

## 2025-05-21 SDOH — HEALTH STABILITY: PHYSICAL HEALTH: EXERCISE ACTIVITY: MARCH

## 2025-05-21 SDOH — HEALTH STABILITY: PHYSICAL HEALTH: EXERCISE ACTIVITY: HEEL SLIDE

## 2025-05-21 SDOH — HEALTH STABILITY: PHYSICAL HEALTH: EXERCISE ACTIVITY: SAQ

## 2025-05-21 SDOH — HEALTH STABILITY: PHYSICAL HEALTH: EXERCISE ACTIVITY: QUAD SET

## 2025-05-21 SDOH — HEALTH STABILITY: PHYSICAL HEALTH: EXERCISE ACTIVITY: HAMSTRING CURL

## 2025-05-21 SDOH — HEALTH STABILITY: PHYSICAL HEALTH: EXERCISE ACTIVITY: MINI SQUAT

## 2025-05-21 SDOH — HEALTH STABILITY: PHYSICAL HEALTH: EXERCISE TYPE: TKA

## 2025-05-21 SDOH — HEALTH STABILITY: PHYSICAL HEALTH: EXERCISE ACTIVITY: LAQ

## 2025-05-21 ASSESSMENT — BALANCE ASSESSMENTS
SITTING BALANCE: 1 - STEADY, SAFE
ARISES: 0 - UNABLE WITHOUT HELP
ARISING SCORE: 0
NUDGED SCORE: 1
TURNING 360 DEGREES STEPS: 0 - DISCONTINUOUS STEPS
BALANCE SCORE: 6
IMMEDIATE STANDING BALANCE FIRST 5 SECONDS: 1 - STEADY BUT USES WALKER OR OTHER SUPPORT
STANDING BALANCE: 1 - STEADY BUT WIDE STANCE AND USES CANE OR OTHER SUPPORT
SITTING DOWN: 1 - USES ARMS OR NOT SMOOTH MOTION
EYES CLOSED AT MAXIMUM POSITION NUDGED: 0 - UNSTEADY
ATTEMPTS TO ARISE: 0 - UNABLE WITHOUT HELP
NUDGED: 1 - STAGGERS, GRABS, CATCHES SELF

## 2025-05-21 ASSESSMENT — ENCOUNTER SYMPTOMS
HYPERTENSION: 1
PAIN: 1
PAIN LOCATION - PAIN SEVERITY: 3/10
PAIN LOCATION: LEFT KNEE
MUSCLE WEAKNESS: 1
LIMITED RANGE OF MOTION: 1
PERSON REPORTING PAIN: PATIENT
ARTHRALGIAS: 1

## 2025-05-21 ASSESSMENT — GAIT ASSESSMENTS
STEP CONTINUITY: 0 - STOPPING OR DISCONTINUITY BETWEEN STEPS
INITIATION OF GAIT IMMEDIATELY AFTER GO: 0 - ANY HESITANCY OR MULTIPLE ATTEMPTS TO START
PATH: 1 - MILD/MODERATE DEVIATION OR USES WALKING AID
TRUNK SCORE: 0
WALKING STANCE: 0 - HEELS APART
STEP SYMMETRY: 0 - RIGHT AND LEFT STEP LENGTH NOT EQUAL
BALANCE AND GAIT SCORE: 9
PATH SCORE: 1
TRUNK: 0 - MARKED SWAY OR USES WALKING AID
GAIT SCORE: 3

## 2025-05-21 ASSESSMENT — ACTIVITIES OF DAILY LIVING (ADL)
AMBULATION_DISTANCE/DURATION_TOLERATED: 60 FT
AMBULATION ASSISTANCE: ONE PERSON
OASIS_M1830: 05
ENTERING_EXITING_HOME: ONE PERSON
CURRENT_FUNCTION: ONE PERSON
AMBULATION ASSISTANCE ON FLAT SURFACES: 1
AMBULATION_DISTANCE/DURATION_TOLERATED: 30'

## 2025-05-23 ENCOUNTER — HOME CARE VISIT (OUTPATIENT)
Dept: HOME HEALTH SERVICES | Facility: HOME HEALTH | Age: 60
End: 2025-05-23
Payer: COMMERCIAL

## 2025-05-23 PROCEDURE — G0151 HHCP-SERV OF PT,EA 15 MIN: HCPCS

## 2025-05-23 SDOH — HEALTH STABILITY: PHYSICAL HEALTH: PHYSICAL EXERCISE: SUPINE

## 2025-05-23 SDOH — HEALTH STABILITY: PHYSICAL HEALTH: PHYSICAL EXERCISE: SEATED

## 2025-05-23 SDOH — HEALTH STABILITY: PHYSICAL HEALTH: EXERCISE ACTIVITY: HEEL SLIDE

## 2025-05-23 SDOH — HEALTH STABILITY: PHYSICAL HEALTH: PHYSICAL EXERCISE: 20

## 2025-05-23 SDOH — HEALTH STABILITY: PHYSICAL HEALTH: PHYSICAL EXERCISE: STANDING

## 2025-05-23 SDOH — HEALTH STABILITY: PHYSICAL HEALTH: EXERCISE ACTIVITY: MINI SQUAT

## 2025-05-23 SDOH — HEALTH STABILITY: PHYSICAL HEALTH: PHYSICAL EXERCISE: 10

## 2025-05-23 SDOH — HEALTH STABILITY: PHYSICAL HEALTH: EXERCISE ACTIVITY: HEEL/TOE RAISE

## 2025-05-23 SDOH — HEALTH STABILITY: PHYSICAL HEALTH: EXERCISE ACTIVITIES SETS: 1

## 2025-05-23 SDOH — HEALTH STABILITY: PHYSICAL HEALTH: EXERCISE ACTIVITY: MARCH

## 2025-05-23 SDOH — HEALTH STABILITY: PHYSICAL HEALTH: EXERCISE ACTIVITY: FOOT PROPED ON PILLOW FOR KNEE EXTENSION X2-5 MINUTES

## 2025-05-23 SDOH — HEALTH STABILITY: PHYSICAL HEALTH

## 2025-05-23 SDOH — HEALTH STABILITY: PHYSICAL HEALTH: EXERCISE ACTIVITY: HAMSTRING CURL

## 2025-05-23 SDOH — HEALTH STABILITY: PHYSICAL HEALTH: PHYSICAL EXERCISE: 1

## 2025-05-23 SDOH — HEALTH STABILITY: PHYSICAL HEALTH: EXERCISE ACTIVITY: ANKLE PUMP

## 2025-05-23 SDOH — HEALTH STABILITY: PHYSICAL HEALTH: EXERCISE ACTIVITY: QUAD SET

## 2025-05-23 SDOH — HEALTH STABILITY: PHYSICAL HEALTH: EXERCISE ACTIVITY: HIP ABDUCTION

## 2025-05-23 SDOH — HEALTH STABILITY: PHYSICAL HEALTH: EXERCISE ACTIVITY: KNEE FLEXION

## 2025-05-23 SDOH — HEALTH STABILITY: PHYSICAL HEALTH: EXERCISE ACTIVITY: SAQ

## 2025-05-23 SDOH — HEALTH STABILITY: PHYSICAL HEALTH: EXERCISE ACTIVITY: LAQ

## 2025-05-23 SDOH — HEALTH STABILITY: PHYSICAL HEALTH: EXERCISE TYPE: TKA

## 2025-05-23 SDOH — HEALTH STABILITY: PHYSICAL HEALTH: EXERCISE ACTIVITY: SLR

## 2025-05-23 ASSESSMENT — ENCOUNTER SYMPTOMS
PAIN LOCATION: LEFT KNEE
PAIN LOCATION - PAIN SEVERITY: 3/10
PERSON REPORTING PAIN: PATIENT
PAIN: 1

## 2025-05-28 ENCOUNTER — HOME CARE VISIT (OUTPATIENT)
Dept: HOME HEALTH SERVICES | Facility: HOME HEALTH | Age: 60
End: 2025-05-28
Payer: COMMERCIAL

## 2025-05-28 VITALS
DIASTOLIC BLOOD PRESSURE: 70 MMHG | TEMPERATURE: 98.7 F | SYSTOLIC BLOOD PRESSURE: 105 MMHG | OXYGEN SATURATION: 98 % | HEART RATE: 97 BPM

## 2025-05-28 PROCEDURE — G0151 HHCP-SERV OF PT,EA 15 MIN: HCPCS

## 2025-05-28 SDOH — HEALTH STABILITY: PHYSICAL HEALTH: EXERCISE ACTIVITIES SETS: 1

## 2025-05-28 SDOH — HEALTH STABILITY: PHYSICAL HEALTH: EXERCISE ACTIVITY: SLR

## 2025-05-28 SDOH — HEALTH STABILITY: PHYSICAL HEALTH: PHYSICAL EXERCISE: 10

## 2025-05-28 SDOH — HEALTH STABILITY: PHYSICAL HEALTH
EXERCISE COMMENTS: PROVIDED VERBAL, VISUAL, TACTILE CUES FOR PROPER TECHNIQUE, EMPHASIS ON 2-3 SECOND HOLDS AT END RANGE TO FACILITATE STRENGTH GAINS.

## 2025-05-28 SDOH — HEALTH STABILITY: PHYSICAL HEALTH: EXERCISE ACTIVITY: MARCH

## 2025-05-28 SDOH — HEALTH STABILITY: PHYSICAL HEALTH: EXERCISE ACTIVITY: ANKLE PUMP

## 2025-05-28 SDOH — HEALTH STABILITY: PHYSICAL HEALTH: PHYSICAL EXERCISE: STANDING

## 2025-05-28 SDOH — HEALTH STABILITY: PHYSICAL HEALTH: PHYSICAL EXERCISE: SEATED

## 2025-05-28 SDOH — HEALTH STABILITY: PHYSICAL HEALTH: PHYSICAL EXERCISE: SUPINE

## 2025-05-28 SDOH — HEALTH STABILITY: PHYSICAL HEALTH: PHYSICAL EXERCISE: 20

## 2025-05-28 SDOH — HEALTH STABILITY: PHYSICAL HEALTH: EXERCISE ACTIVITY: HEEL/TOE RAISE

## 2025-05-28 SDOH — HEALTH STABILITY: PHYSICAL HEALTH: EXERCISE ACTIVITY: HEEL SLIDES

## 2025-05-28 SDOH — HEALTH STABILITY: PHYSICAL HEALTH: EXERCISE ACTIVITY: HIP ABDUCTION

## 2025-05-28 SDOH — HEALTH STABILITY: PHYSICAL HEALTH: EXERCISE TYPE: TKA

## 2025-05-28 SDOH — HEALTH STABILITY: PHYSICAL HEALTH: PHYSICAL EXERCISE: 1

## 2025-05-28 SDOH — HEALTH STABILITY: PHYSICAL HEALTH: EXERCISE ACTIVITY: FOOT PROPED ON PILLOW FOR KNEE EXTENSION X2-5 MINUTES

## 2025-05-28 SDOH — HEALTH STABILITY: PHYSICAL HEALTH: EXERCISE ACTIVITY: KNEE FLEXION

## 2025-05-28 SDOH — HEALTH STABILITY: PHYSICAL HEALTH: EXERCISE ACTIVITY: HAMSTRING CURL

## 2025-05-28 SDOH — HEALTH STABILITY: PHYSICAL HEALTH: EXERCISE ACTIVITY: LAQ

## 2025-05-28 SDOH — HEALTH STABILITY: PHYSICAL HEALTH: EXERCISE ACTIVITY: QUAD SET

## 2025-05-28 SDOH — HEALTH STABILITY: PHYSICAL HEALTH: EXERCISE ACTIVITY: MINI SQUAT

## 2025-05-28 SDOH — HEALTH STABILITY: PHYSICAL HEALTH: EXERCISE ACTIVITY: SAQ

## 2025-05-28 ASSESSMENT — ENCOUNTER SYMPTOMS
PAIN: 1
PAIN LOCATION - PAIN SEVERITY: 2/10
PAIN LOCATION: LEFT KNEE
PERSON REPORTING PAIN: PATIENT

## 2025-05-28 ASSESSMENT — ACTIVITIES OF DAILY LIVING (ADL)
AMBULATION_DISTANCE/DURATION_TOLERATED: 115 FT
AMBULATION ASSISTANCE ON FLAT SURFACES: 1

## 2025-05-29 ENCOUNTER — HOME CARE VISIT (OUTPATIENT)
Dept: HOME HEALTH SERVICES | Facility: HOME HEALTH | Age: 60
End: 2025-05-29
Payer: COMMERCIAL

## 2025-05-29 VITALS
OXYGEN SATURATION: 98 % | SYSTOLIC BLOOD PRESSURE: 110 MMHG | DIASTOLIC BLOOD PRESSURE: 80 MMHG | HEART RATE: 94 BPM | TEMPERATURE: 97.1 F

## 2025-05-29 PROCEDURE — G0151 HHCP-SERV OF PT,EA 15 MIN: HCPCS

## 2025-05-29 ASSESSMENT — ENCOUNTER SYMPTOMS
PAIN LOCATION: LEFT KNEE
PAIN LOCATION - PAIN SEVERITY: 2/10
PAIN: 1
PERSON REPORTING PAIN: PATIENT

## 2025-05-29 ASSESSMENT — ACTIVITIES OF DAILY LIVING (ADL)
OASIS_M1830: 01
HOME_HEALTH_OASIS: 00

## 2025-05-29 NOTE — PROGRESS NOTES
Physical Therapy     Physical Therapy Evaluation                                        Patient Name: Laureano Elizabeth  MRN: [id number]  :  1965  Today's Date: 2025              Per Chart review  25  Patient may bear weight as tolerated to operative extremity with use of walker for assistance with ambulation   Reason for Visit  Referred by: Irving Leiva MD  Referral DX date: 25     Diagnosis:  No diagnosis found.    Insurance:  Visit: #1  POC: ***  Authorized: *Pending*  Certification: 2025 to ***  Payor: ANTHEM / Plan: ANTHEM [facility] / Product Type: *No Product type* /     Subjective:    Current Episode  Date of Onset:  ***  Mechanism of injury:  ***  Chief Complaint:  ***  Relevant incidents/injuries:  ***  Progression of symptoms:  ***  Previous treatments:  ***  Relevant medical history:  ***     Prior level of function: ***  Functional limitations: ***  Home environment: ***  Work status/occupation: ***  Recreational activity: ***     Patient stated goals for treatment: ***     Reviewed medical screening history form with patient.     Precautions: Patient may bear weight as tolerated to operative extremity with use of walker for assistance with ambulation            Pain:  Location: ***  Current pain: ***/10; Range: ***-***/10  Aggravating factor: ***  Alleviating factor: ***     Objective:  Observation/Posture: ***     AROM/PROM  Lower Extremity Right Left   Hip Flex  100     Hip Ext  30     Knee Flex  130     Knee Ext  0     DF  30     PF  40     Hip Abd  40     Hip Add  20          MMT:   Lower Extremity Right Left   Hip Flex ***/5 ***/5   Hip Ext ***/5 ***/5   Knee Flex ***/5 ***/5   Knee Ext ***/5 ***/5   DF ***/5 ***/5   PF ***/5 ***/5   Hip Abd ***/5 ***/5   Hip Add ***/5 ***/5       Balance:   ***     Gait:   ***     Functional testing:   ***    Sensation:   Patient denies numbness/tingling of bilateral {UPPERLOWER:90160} extremities.  Light Touch: ***    Coordination:  Heel to shin:  ***  Finger to nose: ***     Outcome Measure:  {PT Outcome Measures:60044}       Treatment:  Provided education regarding POC, goals created, reasoning for assessments performed and results of those assessments. Additionally, provided education regarding scheduling proposal with pt asked about scheduling preferences, call in/no show policy, and pt provides verbal confirmation of understanding.  ***  ***  ***     OP Education: HEP:        Assessment:  Patient is a 59 y.o. MALE presents to [facility] for assessment and treatment of mobility related impairments s/p *** stroke, TBI, spinal cord injury, etc. complicated by ***.   Patient is alert and oriented x3. Patient presents with medical diagnosis of *** contributing to compensatory soft tissue dysfunction, pain, stiffness and weakness of the ***. Significant past medical history/past surgical history includes ***. Skilled care is needed to progress the patient back to these activities without exacerbating symptoms. Patient requires skilled PT services to address the problems identified and the individualized patient's goals as outlined in the problems and goals section of this evaluation. A skilled PT is required to address these key impairments and to provide and progress with an appropriate home exercise program. Patient {DOES/ DOES NOT:62238} have significant PMH influencing Rx and reports motivation to return to {FUNCT ACTIV- RTW-RTSPORT:37364}. Patient demonstrates to be a *** fair/excellent/good candidate for physical therapy with good rehab potential and verbalized a good understanding of their diagnosis, prognosis and treatment. Pt is motivated and has strong family support which reinforces their potential for improvement.  Pt would benefit from skilled physical therapy to *** improve strength, balance and decrease fall risk.  Goals have been established and reviewed with the patient.                 Plan:  Frequency/duration: ***                                   Goals:  Understand and demonstrate a home exercise program that will support and continue the process of neural plasticity resulting in continued improvement of *** upper/lower extremity function, increased mobility, and safety.  Patient will demonstrate AROM of the *** knee 0-115 to improve their ability to ambulate, negotiate stairs, and squat without restriction  Patient will demonstrate the ability to ascend/descend one flight of stairs reciprocally and without an increase in pain to improve function within the home and the community  Patient will demonstrate gross hip and knee strength of >/= 4+/5 to improve the ability to ambulate, squat, and lift without restrictions  Patient will demonstrate the ability to perform *** sit to stand transfers from a normal chair height without upper extremity assistance and without an increase in left knee pain  Patient will report pain of 0/10 at rest, and no greater than ***/10 with any activity including standing, walking, stairs, and transfers  Patient will demonstrate the ability to ambulate >/= ***' with the least restrictive assistive device and without any major deviations in gait to indicate improved mobility within the home and the community  Patient will demonstrate the ability to perform *** consecutive SLR without quad/extension lag to indicate improved quadriceps activation and control  Patient will demonstrate the ability to perform *** bodyweight squats while maintaining proper hip and knee mechanics and without pain in the knee exceeding 2/10       Complexity:  {complexity:92869} complexity evaluation due to a past medical history {withwithout:34127} personal factors and/or comorbidities that could impact the POC, *** minutes for evaluation, examination of body systems completed with the patient presenting with a stable condition, and clinical decision making.    Abdirizak Darnell, PT  6/2/2025

## 2025-05-30 LAB
LABORATORY COMMENT REPORT: NORMAL
PATH REPORT.FINAL DX SPEC: NORMAL
PATH REPORT.GROSS SPEC: NORMAL
PATH REPORT.RELEVANT HX SPEC: NORMAL
PATH REPORT.TOTAL CANCER: NORMAL

## 2025-06-02 ENCOUNTER — APPOINTMENT (OUTPATIENT)
Dept: PHYSICAL THERAPY | Facility: CLINIC | Age: 60
End: 2025-06-02
Payer: COMMERCIAL

## 2025-06-03 ENCOUNTER — HOSPITAL ENCOUNTER (OUTPATIENT)
Dept: RADIOLOGY | Facility: CLINIC | Age: 60
Discharge: HOME | End: 2025-06-03
Payer: COMMERCIAL

## 2025-06-03 ENCOUNTER — OFFICE VISIT (OUTPATIENT)
Dept: ORTHOPEDIC SURGERY | Facility: CLINIC | Age: 60
End: 2025-06-03
Payer: COMMERCIAL

## 2025-06-03 DIAGNOSIS — Z96.652 TOTAL KNEE REPLACEMENT STATUS, LEFT: ICD-10-CM

## 2025-06-03 DIAGNOSIS — G89.18 POST-OP PAIN: Primary | ICD-10-CM

## 2025-06-03 PROCEDURE — 1036F TOBACCO NON-USER: CPT | Performed by: ORTHOPAEDIC SURGERY

## 2025-06-03 PROCEDURE — 99024 POSTOP FOLLOW-UP VISIT: CPT | Performed by: ORTHOPAEDIC SURGERY

## 2025-06-03 PROCEDURE — 99212 OFFICE O/P EST SF 10 MIN: CPT | Performed by: ORTHOPAEDIC SURGERY

## 2025-06-03 PROCEDURE — 73562 X-RAY EXAM OF KNEE 3: CPT | Mod: LEFT SIDE | Performed by: RADIOLOGY

## 2025-06-03 PROCEDURE — 73562 X-RAY EXAM OF KNEE 3: CPT | Mod: LT

## 2025-06-03 NOTE — PROGRESS NOTES
History of Present Illness:  Patient returns today endorsing moderate pain.  Denies any numbness or tingling.  No calf pain, No chest pain or shortness of breath.    Physical Examination:  Mild swelling  Healthy incision, no erythema or drainage  ROM:  0-100  + Plantar/dorsiflexion  Negative Lucero test    Imaging:  Appropriate position and alignment no evidence of implant failure     Assessment  Patient status post left total knee arthroplasty, doing well    Plan:  Discussed analgesics, encouraging non-opioid modalities.  Encouraged ice, rest.  Discussed importance of ROM/ formal physical therapy.  Reviewed dental prophylaxis.  Follow-up in 1 month for a motion check.    Tuan Galvan PA-C      In a face to face encounter, I evaluated the patient and performed a physical examination, discussed pertinent diagnostic studies if indicated and discussed diagnosis and management strategies with both the patient and physician assistant / nurse practitioner.  I reviewed the PA/NP's note and agree with the documented findings and plan of care.        Irving Leiva MD

## 2025-06-05 ENCOUNTER — APPOINTMENT (OUTPATIENT)
Dept: PHYSICAL THERAPY | Facility: CLINIC | Age: 60
End: 2025-06-05
Payer: COMMERCIAL

## 2025-06-09 ENCOUNTER — EVALUATION (OUTPATIENT)
Dept: PHYSICAL THERAPY | Facility: CLINIC | Age: 60
End: 2025-06-09
Payer: COMMERCIAL

## 2025-06-09 ENCOUNTER — APPOINTMENT (OUTPATIENT)
Dept: PHYSICAL THERAPY | Facility: CLINIC | Age: 60
End: 2025-06-09
Payer: COMMERCIAL

## 2025-06-09 DIAGNOSIS — Z96.652 TOTAL KNEE REPLACEMENT STATUS, LEFT: Primary | ICD-10-CM

## 2025-06-09 DIAGNOSIS — M17.12 UNILATERAL PRIMARY OSTEOARTHRITIS, LEFT KNEE: ICD-10-CM

## 2025-06-09 PROCEDURE — 97110 THERAPEUTIC EXERCISES: CPT | Mod: GP | Performed by: PHYSICAL THERAPIST

## 2025-06-09 PROCEDURE — 97161 PT EVAL LOW COMPLEX 20 MIN: CPT | Mod: GP | Performed by: PHYSICAL THERAPIST

## 2025-06-09 ASSESSMENT — ENCOUNTER SYMPTOMS
DEPRESSION: 0
OCCASIONAL FEELINGS OF UNSTEADINESS: 1
LOSS OF SENSATION IN FEET: 0

## 2025-06-09 NOTE — PROGRESS NOTES
Initial evaluation  Physical Therapy Initial Evaluation    Patient Name:Laureano Elizabeth  MRN:[id number]  Today's Date:6/9/2025  Referred by: Irving eLiva  Time Calculation  Start Time: 1410  Stop Time: 1445  Time Calculation (min): 35 min    Therapy Diagnosis  1. Total knee replacement status, left    2. Unilateral primary osteoarthritis, left knee         Plan of Care  Planned interventions include PRN: therapeutic exercise, manual therapy, gait training, electrical stimulation, hot pack, vasopneumatic device with cold, HEP training.   Frequency and duration: 2 time(s) a week, for  8 weeks or 16 visits .   Plan of care was developed with input and agreement by the patient.   Plan for next session: review HEP, manual therapy for ROM, gait training Quad strengthening     Assessment  Problem List: Pain, range of motion/joint mobility, strength, activity limitations, ADLs/IADLs/self care skills, decreased functional level, balance, fall risk, decreased knowledge of assisted device use, decreased knowledge of HEP, edema, flexibility, gait/locomotion, and posture.     Patient is a 59 y.o. male who presents with complaint of s/p L TKA. Standardized testing and measures administered today reveal that the patient has multiple impairments in body structures and functions, activity limitations, and participation restrictions. These include subjective and objective findings such as pain, tenderness to palpation of the affected area, decreased ROM, strength, flexibility, and function. The patient's impairments are likely influenced by mechanical dysfunction and deconditioning with possible overuse and degenerative changes. Skilled PT services are warranted in order to realize measurable and meaningful change in the above outcome measures and achieve improvements in the patient's functional status and individual goals.    Rehab Potential:good  Clinical Presentation: Stable and/or uncomplicated characteristics.   Evaluation  Complexity: Low      Precautions/Fall Risk:fall risk mild/WBAT with walker Pacemaker no  Seizures No  Post Op Movement/Restrictions No    Insurance  Visit number: 1   Approved number of visits: 20 CY  Onset Date: 2025  Certification Period:  Beginnin/9/2025            Ending:   Payor: JABARI / Plan: JABARI [facility] / Product Type: *No Product type* /     Subjective  Chief complaint/reason for visit: Patient is a 59 year old male s/p L TKA 2025 discharged from  therapy to outpatient PT. Continues to use FWW for ambulation. Mild knee pain currently. Edema in bilateral leg/foot (even before surgery) making donning shoes difficult.  Mechanism of Injury:  a chronic condition  Location of Pain: L knee globally  Current Pain Level (0-10): 2   High Pain Level (0-10): 8   Low Pain Level (0-10): 2  Pain Quality: aching, sharp, and tightness  Pain Exacerbating Factors: standing, walking, stairs  Pain Relieving Factors: ice, medications Tylenol, and position change    Medical Screening: Reviewed medical history form with patient and medical screening assessed.   Red Flags: Do you have any of the following? No  Fever/chills, unexplained weight changes, dizziness/fainting, unexplained change in bowel or bladder functions, unexplained malaise or muscle weakness, night pain/sweats, numbness or tingling  Current Medical Management: Surgery, Home health PT  Prior Level of Function (PLOF)  Patient previously independent with all ADLs  Exercise/Physical Activity: Walking  Functional limitations: decreased positional tolerances to standing, sitting, walking, bending, driving, stairs, self-care activities, work related tasks, participation in home management/duties, participation in leisure activities and athletics.  Work Status: full time job doing Desk Job - self-employed  Current Status: improved  Patient Awareness: Patient is aware of  his diagnosis and prognosis.   Living Environment: house - basement and 3 steps to  get into house  Social Support: lives alone  Personal Factors That May Impact Care: see PMH  Patient's Goal for Treatment: relieving pain, increasing strength, increasing mobility, improving positional tolerances, walking with a normal gait, reducing symptoms, returning to work symptom free, and resuming leisure activities .     Objective  Other Measures  Lower Extremity Funtional Score (LEFS): 29     Knee Objective  Observation/Posture: incision closed and without drainage, no signs of infection  Palpation: mild TTP distal incision   Gait: WBAT with FWW, sometimes will use can in the house   AROM (tested in supine):  R/L knee flexion AROM (degrees): WFL  // 98 deg  R/L knee extension AROM (degrees): WFL // lacking 10 deg     MMT:   R/L knee flexion strength (MMT): 4/5 with pain  R/L knee extension strength (MMT): 4+  R/L ankle PF strength (MMT): 4+  R/L ankle DF strength (MMT): 4+    R/L single leg stance time (seconds): deferred  Functional Squat: deferred  Step up/Stairs: step to pattern with railing    Treatment Performed Today: Initial evaluation and patient education regarding diagnosis, prognosis, contributing factors, comorbidities, importance and instruction of HEP, role of PT, activity modification, appropriate shoe wear, safety with gait/use of assistive device, and role of compression.    Therapeutic Exercise 20 minutes  Education/Resources provided today: Home Program   [employer]:   Access Code: [email or web address]  URL: [email or web address]  Date: 06/09/2025  Prepared by: Cindy Kulkarni    Exercises  - Supine Heel Slide with Strap  - 1 x daily - 7 x weekly - 3 sets - 10 reps - 5 hold  - Long Sitting Quad Set with Towel Roll Under Heel  - 1 x daily - 7 x weekly - 3 sets - 10 reps - 5 hold  - Seated Long Arc Quad  - 1 x daily - 7 x weekly - 3 sets - 10 reps  - Seated Table Hamstring Stretch  - 1 x daily - 7 x weekly - 3 sets - 30 hold    Modalities   Vasopneumatic Device        minutes  Electrical Stimulation          minutes    Response to Treatment: improved knowledge and understanding of condition, decreased pain, improved joint mobility/ROM, improved strength, improved flexibility, improved tissue mobility    Goals: Goals set and discussed today.   Lower Extremity Goals  Lower Extremity Goals: By discharge, patient will:  1) Demonstrate independence with home exercise program for overall symptom management  2) Increase overall exercise tolerance without adverse reaction or increased chief complaint  3) Increase ROM of the  L knee to contralateral knee in order to improve the ability to perform essential ADLs  4) Increase strength of the L knee to 4+/5 or > in order to improve the ability to perform essential ADLs  5) Report decrease in pain by >= 2 points to meet MCID  6) Increase score of LEFS by > 9 points to meet the MCID  7) Ascend and descend 1 flight of stairs reciprocally and safely without an increase in symptoms  8) Ambulate x community distances  with/without assistive device without an increase in symptoms  9) Be able to perform the ADL of squatting to retreive object from floor without an increase or production of symptoms  10) Decrease time on 5x sit to stand test by > 2.3 sec to meet the MCID (test next session)  Patient stated goal: return to [facility] without pain, help with walking

## 2025-06-12 ENCOUNTER — APPOINTMENT (OUTPATIENT)
Dept: PHYSICAL THERAPY | Facility: CLINIC | Age: 60
End: 2025-06-12
Payer: COMMERCIAL

## 2025-06-12 ENCOUNTER — TREATMENT (OUTPATIENT)
Dept: PHYSICAL THERAPY | Facility: CLINIC | Age: 60
End: 2025-06-12
Payer: COMMERCIAL

## 2025-06-12 DIAGNOSIS — Z96.652 TOTAL KNEE REPLACEMENT STATUS, LEFT: ICD-10-CM

## 2025-06-12 DIAGNOSIS — M17.12 UNILATERAL PRIMARY OSTEOARTHRITIS, LEFT KNEE: ICD-10-CM

## 2025-06-12 PROCEDURE — 97110 THERAPEUTIC EXERCISES: CPT | Mod: GP,CQ

## 2025-06-12 PROCEDURE — 97140 MANUAL THERAPY 1/> REGIONS: CPT | Mod: CQ,GP

## 2025-06-12 ASSESSMENT — PAIN SCALES - GENERAL: PAINLEVEL_OUTOF10: 2

## 2025-06-12 ASSESSMENT — PAIN - FUNCTIONAL ASSESSMENT: PAIN_FUNCTIONAL_ASSESSMENT: 0-10

## 2025-06-12 NOTE — PROGRESS NOTES
"      Physical Therapy Treatment    Patient Name: Laureano Elizabeth \"Juan\"  MRN: [id number]  Today's Date: 6/12/2025  Time Calculation  Start Time: 1020  Stop Time: 1100  Time Calculation (min): 40 min  PT Therapeutic Procedures Time Entry  Therapeutic Exercise Time Entry: 10  Manual Therapy Time Entry: 30     Insurance   Visit #2  OA LEFT KNEE M17.12 (L TKR 5/16/25) ; ANTHEM BCBS: 2500/5000 DED IS MET FOR 2025 // 6000/12000 OOP // 30% COINSUR // 0 COPAY // 20V CY OUTPATIENT AND OFFICE COMBINED // PRIOR AUTH IS NOT REQ PER [email or web address] REF#: [id number]  Current Problem:  1. Total knee replacement status, left  Follow Up In Physical Therapy      2. Unilateral primary osteoarthritis, left knee  Follow Up In Physical Therapy          Subjective:  I feel tired from walking up to the clinic from the parking lot.  I am walking at home outside on my driveway.  I am working on my HEP as I am able.  Pain:  Pain Assessment: 0-10  0-10 (Numeric) Pain Score: 2  Pain Type: Surgical pain  Pain Location: Knee  Pain Orientation: Left  Objective:  L Knee   0-15-94//101  Precautions  Precautions  Precautions Comment: TKR protocol  Treatments:  Therapeutic Exercise 15109: Unit(s)-1  Supine Heel prop extension stretch x 60sec x 2  Supine heel slide x 10\"hold into flexion/extension  Seated Hamcurl at edge of bed 1x10 vs red band  Instruction on Heel/toe gait in Wheeled walker  Manual Therapy 21757: Unit(s)-2  GR III Patellar Mobs Inf/Sup 3x 10 to L Knee  GR III Patellar Mobs Med/Lat 1 x 10 to L Knee  Supine Manual Knee extension stretch with towel roll under heel with OP x 1 min  Seated at edge of plinth PROM to L Knee  x 15 min  Supine PROM- L knee in supine x 10 min  Assessment: Improved ability with Passive and Active ROM as session progress.   Plan: Continue 2-3 x week work to improve AROM and function as able.  "

## 2025-06-16 ENCOUNTER — TREATMENT (OUTPATIENT)
Dept: PHYSICAL THERAPY | Facility: CLINIC | Age: 60
End: 2025-06-16
Payer: COMMERCIAL

## 2025-06-16 DIAGNOSIS — M17.12 UNILATERAL PRIMARY OSTEOARTHRITIS, LEFT KNEE: ICD-10-CM

## 2025-06-16 DIAGNOSIS — Z96.652 TOTAL KNEE REPLACEMENT STATUS, LEFT: ICD-10-CM

## 2025-06-16 PROCEDURE — 97110 THERAPEUTIC EXERCISES: CPT | Mod: GP | Performed by: PHYSICAL THERAPIST

## 2025-06-16 PROCEDURE — 97140 MANUAL THERAPY 1/> REGIONS: CPT | Mod: GP | Performed by: PHYSICAL THERAPIST

## 2025-06-16 ASSESSMENT — PAIN SCALES - GENERAL: PAINLEVEL_OUTOF10: 0 - NO PAIN

## 2025-06-16 ASSESSMENT — PAIN DESCRIPTION - DESCRIPTORS: DESCRIPTORS: TIGHTNESS

## 2025-06-16 ASSESSMENT — PAIN - FUNCTIONAL ASSESSMENT: PAIN_FUNCTIONAL_ASSESSMENT: 0-10

## 2025-06-16 NOTE — PROGRESS NOTES
"      Physical Therapy Treatment    Patient Name: Laureano Elizabeth \"Juan\"  MRN: [id number]  Today's Date: 6/16/2025  Time Calculation  Start Time: 1406  Stop Time: 1445  Time Calculation (min): 39 min  PT Therapeutic Procedures Time Entry  Therapeutic Exercise Time Entry: 30  Manual Therapy Time Entry: 9     Insurance   Visit #3  OA LEFT KNEE M17.12 (L TKR 5/16/25) ; ANTHEM [employer]BS: 2500/5000 DED IS MET FOR 2025 // 6000/12000 OOP // 30% COINSUR // 0 COPAY // 20V CY OUTPATIENT AND OFFICE COMBINED // PRIOR AUTH IS NOT REQ PER [email or web address] REF#: [id number]  Current Problem:  1. Total knee replacement status, left  Follow Up In Physical Therapy      2. Unilateral primary osteoarthritis, left knee  Follow Up In Physical Therapy          Subjective: Patient reports no pain today just swelling in the lower leg and foot which inhibits his mobility. He reports progressing to cane use for shorter distances and walker for longer distances.     Pain:  Pain Assessment: 0-10  0-10 (Numeric) Pain Score: 0 - No pain  Pain Location: Knee  Pain Radiating Towards: foot  Pain Descriptors: Tightness    Objective:  L Knee   97 deg AAROM flexion  AROM extension: 12 deg   Precautions  Precautions  Precautions Comment: TKR protocol    Treatments:  Therapeutic Exercise 94104: Unit(s)-2 units 30 minutes  LAQ: 2 x 10 (manual OP second set) - very resistive   Supine heel slide 4'  x 5\"hold into flexion/extension  Diagonal step through(s): x 10 R/L  STS: x 10   LE Bike: 5 minutes Seat distance 12 Rocking/full rotation    Manual Therapy 79455: Unit(s)-1 unit 9 minutes  GR III Patellar Mobs Inf/Sup 3x 10 to L Knee  GR III Patellar Mobs Med/Lat 1 x 10 to L Knee  Supine Manual Knee extension stretch with towel roll under heel with OP x 1 min  Seated at edge of plinth PROM to L Knee  x 5  Supine PROM- L knee in supine x 10 min    Assessment: Patient very guarded with passive manual techniques resulting in overall restriction in movement. " Have You Had Botox Before?: has had botox Limited manual therapy techniques due to self-limiting due to pain. Focused treatment on AAROM for knee ROM. He has poor active quad contraction due to strength and extension limitations. Cuing and supervision provided throughout session for correct form when performing functional mobility such as sit to stands.     Plan: Continue 2-3 x week work to improve AROM and function as able.  AAROM exercises paired with manual therapy  Gait Training

## 2025-06-19 ENCOUNTER — TREATMENT (OUTPATIENT)
Dept: PHYSICAL THERAPY | Facility: CLINIC | Age: 60
End: 2025-06-19
Payer: COMMERCIAL

## 2025-06-19 ENCOUNTER — APPOINTMENT (OUTPATIENT)
Dept: PHYSICAL THERAPY | Facility: CLINIC | Age: 60
End: 2025-06-19
Payer: COMMERCIAL

## 2025-06-19 DIAGNOSIS — Z96.652 TOTAL KNEE REPLACEMENT STATUS, LEFT: Primary | ICD-10-CM

## 2025-06-19 DIAGNOSIS — M17.12 UNILATERAL PRIMARY OSTEOARTHRITIS, LEFT KNEE: ICD-10-CM

## 2025-06-19 PROCEDURE — 97110 THERAPEUTIC EXERCISES: CPT | Mod: GP,CQ

## 2025-06-19 PROCEDURE — 97140 MANUAL THERAPY 1/> REGIONS: CPT | Mod: CQ,GP

## 2025-06-19 ASSESSMENT — PAIN SCALES - GENERAL: PAINLEVEL_OUTOF10: 0 - NO PAIN

## 2025-06-19 ASSESSMENT — PAIN - FUNCTIONAL ASSESSMENT: PAIN_FUNCTIONAL_ASSESSMENT: 0-10

## 2025-06-19 ASSESSMENT — PAIN DESCRIPTION - DESCRIPTORS: DESCRIPTORS: TIGHTNESS

## 2025-06-19 NOTE — PROGRESS NOTES
"      Physical Therapy Treatment    Patient Name: Laureano Elizabeth \"Juan\"  MRN: [id number]  Today's Date: 6/19/2025  Time Calculation  Start Time: 1608  Stop Time: 1648  Time Calculation (min): 40 min  PT Therapeutic Procedures Time Entry  Therapeutic Exercise Time Entry: 15  Manual Therapy Time Entry: 25     Insurance   Visit #4  OA LEFT KNEE M17.12 (L TKR 5/16/25) ; [employer] BCBS: 2500/5000 DED IS MET FOR 2025 // 6000/12000 OOP // 30% COINSUR // 0 COPAY // 20V CY OUTPATIENT AND OFFICE COMBINED // PRIOR AUTH IS NOT REQ PER [employer] REF#: [id number]  Current Problem:  1. Total knee replacement status, left  Follow Up In Physical Therapy      2. Unilateral primary osteoarthritis, left knee  Follow Up In Physical Therapy          Subjective:  I feel stronger overall. I feel like my knee is swollen.   Pain:  Pain Assessment: 0-10  0-10 (Numeric) Pain Score: 0 - No pain  Pain Location: Knee  Pain Descriptors: Tightness  Objective:  L Knee   0-12-93  Precautions  Precautions  Precautions Comment: TKR protocol  Treatments:  Therapeutic Exercise 80639: Unit(s)-1  Supine Heel prop extension stretch x 60sec   Recumbent  Bike -seat 11 x 5 min Level 1  STS from raised table x 10 with hip hinge  Seated Heel slide into flexion/extension x 10\" hold x 10   Seated LAQ with assistance to obtain TKE L LE x 10  Manual Therapy 16188: Unit(s)-1  GR III Patellar Mobs Inf glide with knee flexed at 90 deg  GR III Patellar Mobs Med/Lat 1 x 10 to L Knee  Supine Manual Knee extension stretch with towel roll under heel with OP x 1 min  Seated at edge of plinth PROM to L Knee  x 20 min  Assessment: Improved ability with knee ROM, patient able to complete full revolutions on bike.   Plan: Continue 2-3 x week work to improve AROM, strength and  improve gait.  "

## 2025-06-23 ENCOUNTER — TREATMENT (OUTPATIENT)
Dept: PHYSICAL THERAPY | Facility: CLINIC | Age: 60
End: 2025-06-23
Payer: COMMERCIAL

## 2025-06-23 ENCOUNTER — APPOINTMENT (OUTPATIENT)
Dept: PHYSICAL THERAPY | Facility: CLINIC | Age: 60
End: 2025-06-23
Payer: COMMERCIAL

## 2025-06-23 DIAGNOSIS — M17.12 UNILATERAL PRIMARY OSTEOARTHRITIS, LEFT KNEE: ICD-10-CM

## 2025-06-23 DIAGNOSIS — Z96.652 TOTAL KNEE REPLACEMENT STATUS, LEFT: ICD-10-CM

## 2025-06-23 PROCEDURE — 97530 THERAPEUTIC ACTIVITIES: CPT | Mod: GP | Performed by: PHYSICAL THERAPIST

## 2025-06-23 PROCEDURE — 97110 THERAPEUTIC EXERCISES: CPT | Mod: GP | Performed by: PHYSICAL THERAPIST

## 2025-06-23 ASSESSMENT — PAIN - FUNCTIONAL ASSESSMENT: PAIN_FUNCTIONAL_ASSESSMENT: 0-10

## 2025-06-23 ASSESSMENT — PAIN SCALES - GENERAL: PAINLEVEL_OUTOF10: 0 - NO PAIN

## 2025-06-23 ASSESSMENT — PAIN DESCRIPTION - DESCRIPTORS: DESCRIPTORS: HEAVINESS;TIGHTNESS

## 2025-06-23 NOTE — PROGRESS NOTES
"      Physical Therapy Treatment    Patient Name: Laureano Elizabeth \"Juan\"  MRN: [id number]  Today's Date: 6/23/2025  Time Calculation  Start Time: 1320  Stop Time: 1401  Time Calculation (min): 41 min  PT Therapeutic Procedures Time Entry  Therapeutic Exercise Time Entry: 16  Therapeutic Activity Time Entry: 25     Insurance   Visit #5  OA LEFT KNEE M17.12 (L TKR 5/16/25) ; [employer]: 2500/5000 DED IS MET FOR 2025 // 6000/12000 OOP // 30% COINSUR // 0 COPAY // 20V CY OUTPATIENT AND OFFICE COMBINED // PRIOR AUTH IS NOT REQ PER [employer] REF#: [id number]  Current Problem:  1. Total knee replacement status, left  Follow Up In Physical Therapy      2. Unilateral primary osteoarthritis, left knee  Follow Up In Physical Therapy          Subjective: Patient reports he is doing well. He doesn't have pain at arrival just a lot of swelling in the leg.    Pain:  Pain Assessment: 0-10  0-10 (Numeric) Pain Score: 0 - No pain  Pain Location: Knee  Pain Orientation: Left, Lower  Pain Radiating Towards: Leg  Pain Descriptors: Heaviness, Tightness    Objective:  L Knee   0-12-93 (not updated this date)    Precautions  Precautions  Precautions Comment: TKR protocol  Treatments:  Therapeutic Exercise 99366: Unit(s)-1 -16 minutes  Recumbent  Bike -seat 11 x 5 min Level 1  Supine Heel slide into flexion/extension x 10\" hold x 10   Seated LAQ with assistance to obtain TKE L LE x 10    Therapeutic Activity 06286: 2 units 25 minutes  Step up 5\" x 10 (HHA @ TG)  SLS 5\" x 10 (use of finger tips)  TG Lvl 5 BW: 2 x 10   STS from lower table x 10 with hip hinge  Supine Heel slide into flexion/extension x 10\" hold x 10   Seated LAQ with assistance to obtain TKE L LE x 10    Manual Therapy 89123: Unit(s)    Assessment: Despite patient reports of no pain, he remains limited on bike today with limited ability to make full rotation (backwards) from seat distance 11 which he was able to fully rotate last visit. He is able to perform 5\" " step with use of hands leading with surgical leg first today. He was able to do his laundry for the first time in 6 weeks performing the stairs at home (step-to pattern). Focused on balance, strength, and AAROM this date and less on manual therapy as he is guarded and resistant to manual pressure on the knee due to pain. He has progressed with overall mobility and strength with independence as he no longer is using walker and only cane use for out of home distances.     Plan: Continue 2-3 x week work to improve AROM, strength and  improve gait.

## 2025-07-01 ENCOUNTER — OFFICE VISIT (OUTPATIENT)
Dept: ORTHOPEDIC SURGERY | Facility: CLINIC | Age: 60
End: 2025-07-01
Payer: COMMERCIAL

## 2025-07-01 DIAGNOSIS — G89.18 POST-OP PAIN: Primary | ICD-10-CM

## 2025-07-01 DIAGNOSIS — M17.12 PRIMARY OSTEOARTHRITIS OF LEFT KNEE: ICD-10-CM

## 2025-07-01 PROBLEM — M24.562 CONTRACTURE, LEFT KNEE: Status: ACTIVE | Noted: 2025-07-01

## 2025-07-01 PROCEDURE — 99212 OFFICE O/P EST SF 10 MIN: CPT | Performed by: STUDENT IN AN ORGANIZED HEALTH CARE EDUCATION/TRAINING PROGRAM

## 2025-07-01 PROCEDURE — 99024 POSTOP FOLLOW-UP VISIT: CPT | Performed by: STUDENT IN AN ORGANIZED HEALTH CARE EDUCATION/TRAINING PROGRAM

## 2025-07-01 RX ORDER — MELOXICAM 15 MG/1
15 TABLET ORAL DAILY
Qty: 30 TABLET | Refills: 0 | Status: SHIPPED | OUTPATIENT
Start: 2025-07-01

## 2025-07-01 NOTE — PROGRESS NOTES
History of Present Illness:  Patient returns today endorsing mild pain.  Patient notes improving functional status.  He notes that physical therapy is progressing nicely.  He does feel as if anti-inflammatories will help him more significant at this point in time.  His knee replacement was performed on 5/16/2025.  He is about 7 weeks out today.    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  Reviewed range of motion, strength goals.  Discussed activities to avoid  Dental prophylaxis discussed.  Ultimately we reviewed with the patient and we feel he is regressing in terms of his range of motion, we do feel that anti-inflammatories, steroids, medicines will help him with this however given his contralateral degree of motion as well as his preoperative range of motion we do feel that he is likely trending towards manipulation under anesthesia for arthrofibrosis.  We discussed the procedure at length with the patient and reviewed physical therapy goals postoperatively.  Follow-up: After manipulation    Tuan Galvan PA-C     In a face to face encounter, I evaluated the patient and performed a physical examination, discussed pertinent diagnostic studies if indicated and discussed diagnosis and management strategies with both the patient and physician assistant / nurse practitioner.  I reviewed the PA/NP's note and agree with the documented findings and plan of care.        Irving Leiva MD

## 2025-07-03 ENCOUNTER — APPOINTMENT (OUTPATIENT)
Dept: PHYSICAL THERAPY | Facility: CLINIC | Age: 60
End: 2025-07-03
Payer: COMMERCIAL

## 2025-07-03 RX ORDER — ACETAMINOPHEN 325 MG/1
650 TABLET ORAL ONCE
OUTPATIENT
Start: 2025-07-03 | End: 2025-07-03

## 2025-07-03 RX ORDER — CELECOXIB 50 MG/1
200 CAPSULE ORAL ONCE
OUTPATIENT
Start: 2025-07-03 | End: 2025-07-03

## 2025-07-03 NOTE — H&P
History Of Present Illness  Juan Elizabeth is a 60 y.o. male presenting with left knee pain and arthrofibrosis status post total knee arthroplasty.     Past Medical History  He has a past medical history of Asthma and HTN (hypertension).    Surgical History  He has a past surgical history that includes Appendectomy and Other surgical history.     Social History  He reports that he has never smoked. He has never used smokeless tobacco. He reports that he does not currently use alcohol. He reports that he does not use drugs.    Family History  Family History[1]     Allergies  Patient has no known allergies.    Review of Systems CONSTITUTIONAL: Denies weight loss, fever and chills.    - HEENT: Denies changes in vision and hearing.    - RESPIRATORY: Denies SOB and cough.    - CV: Denies palpitations and CP.    - GI: Denies abdominal pain, nausea, vomiting and diarrhea.    - : Denies dysuria and urinary frequency.    - MSK: See physical exam findings     - SKIN: Denies rash and pruritus.    - NEUROLOGICAL: Denies headache and syncope.    - PSYCHIATRIC: Denies recent changes in mood. Denies anxiety and depression.     Physical Exam- GENERAL: Alert and oriented x 3. No acute distress. Well-nourished.    - EYES: EOMI. Anicteric.    - HENT: Moist mucous membranes. No scleral icterus. No cervical lymphadenopathy.    - LUNGS: Clear to auscultation bilaterally. No accessory muscle use.    - CARDIOVASCULAR: Regular rate and rhythm. No murmur. No JVD.    - ABDOMEN: Soft, non-tender and non-distended. No palpable masses.    - EXTREMITIES: Range of motion 0-95    - NEUROLOGIC: No focal neurological deficits. CN II-XII grossly intact, but not individually tested.    - PSYCHIATRIC: Cooperative. Appropriate mood and affect.     Last Recorded Vitals  There were no vitals taken for this visit.    Relevant Results      Scheduled medications  Scheduled Medications[2]  Continuous medications  Continuous Medications[3]  PRN medications  PRN  Medications[4]  No results found for this or any previous visit (from the past 24 hours).    Assessment/Plan   Assessment & Plan  Contracture, left knee      We discussed manipulation under anesthesia for left knee arthrofibrosis, patient agreeable like to proceed.       I spent 10 minutes in the professional and overall care of this patient.      Tuan Galvan PA-C         [1] No family history on file.  [2] [3] [4]

## 2025-07-07 ENCOUNTER — ANESTHESIA (OUTPATIENT)
Dept: OPERATING ROOM | Facility: HOSPITAL | Age: 60
End: 2025-07-07
Payer: COMMERCIAL

## 2025-07-07 ENCOUNTER — ANESTHESIA EVENT (OUTPATIENT)
Dept: OPERATING ROOM | Facility: HOSPITAL | Age: 60
End: 2025-07-07
Payer: COMMERCIAL

## 2025-07-07 ENCOUNTER — HOSPITAL ENCOUNTER (OUTPATIENT)
Facility: HOSPITAL | Age: 60
Setting detail: OUTPATIENT SURGERY
Discharge: HOME | End: 2025-07-07
Attending: ORTHOPAEDIC SURGERY | Admitting: ORTHOPAEDIC SURGERY
Payer: COMMERCIAL

## 2025-07-07 ENCOUNTER — PHARMACY VISIT (OUTPATIENT)
Dept: PHARMACY | Facility: CLINIC | Age: 60
End: 2025-07-07
Payer: MEDICARE

## 2025-07-07 VITALS
HEART RATE: 91 BPM | RESPIRATION RATE: 18 BRPM | OXYGEN SATURATION: 96 % | TEMPERATURE: 97.9 F | SYSTOLIC BLOOD PRESSURE: 120 MMHG | BODY MASS INDEX: 35.79 KG/M2 | WEIGHT: 250 LBS | HEIGHT: 70 IN | DIASTOLIC BLOOD PRESSURE: 83 MMHG

## 2025-07-07 DIAGNOSIS — G89.18 POST-OPERATIVE PAIN: Primary | ICD-10-CM

## 2025-07-07 DIAGNOSIS — M24.562 CONTRACTURE, LEFT KNEE: ICD-10-CM

## 2025-07-07 PROCEDURE — 7100000002 HC RECOVERY ROOM TIME - EACH INCREMENTAL 1 MINUTE: Performed by: ORTHOPAEDIC SURGERY

## 2025-07-07 PROCEDURE — 2500000005 HC RX 250 GENERAL PHARMACY W/O HCPCS: Performed by: ANESTHESIOLOGY

## 2025-07-07 PROCEDURE — RXMED WILLOW AMBULATORY MEDICATION CHARGE

## 2025-07-07 PROCEDURE — 2500000001 HC RX 250 WO HCPCS SELF ADMINISTERED DRUGS (ALT 637 FOR MEDICARE OP): Performed by: STUDENT IN AN ORGANIZED HEALTH CARE EDUCATION/TRAINING PROGRAM

## 2025-07-07 PROCEDURE — A27570 PR MANIPULATN KNEE JT+ANESTHESIA: Performed by: ANESTHESIOLOGY

## 2025-07-07 PROCEDURE — 7100000010 HC PHASE TWO TIME - EACH INCREMENTAL 1 MINUTE: Performed by: ORTHOPAEDIC SURGERY

## 2025-07-07 PROCEDURE — 3700000001 HC GENERAL ANESTHESIA TIME - INITIAL BASE CHARGE: Performed by: ORTHOPAEDIC SURGERY

## 2025-07-07 PROCEDURE — 3700000002 HC GENERAL ANESTHESIA TIME - EACH INCREMENTAL 1 MINUTE: Performed by: ORTHOPAEDIC SURGERY

## 2025-07-07 PROCEDURE — 7100000001 HC RECOVERY ROOM TIME - INITIAL BASE CHARGE: Performed by: ORTHOPAEDIC SURGERY

## 2025-07-07 PROCEDURE — 7100000009 HC PHASE TWO TIME - INITIAL BASE CHARGE: Performed by: ORTHOPAEDIC SURGERY

## 2025-07-07 PROCEDURE — 3600000007 HC OR TIME - EACH INCREMENTAL 1 MINUTE - PROCEDURE LEVEL TWO: Performed by: ORTHOPAEDIC SURGERY

## 2025-07-07 PROCEDURE — 2500000004 HC RX 250 GENERAL PHARMACY W/ HCPCS (ALT 636 FOR OP/ED): Performed by: NURSE ANESTHETIST, CERTIFIED REGISTERED

## 2025-07-07 PROCEDURE — A27570 PR MANIPULATN KNEE JT+ANESTHESIA: Performed by: NURSE ANESTHETIST, CERTIFIED REGISTERED

## 2025-07-07 PROCEDURE — 3600000002 HC OR TIME - INITIAL BASE CHARGE - PROCEDURE LEVEL TWO: Performed by: ORTHOPAEDIC SURGERY

## 2025-07-07 PROCEDURE — 27570 FIXATION OF KNEE JOINT: CPT | Performed by: ORTHOPAEDIC SURGERY

## 2025-07-07 RX ORDER — HYDROMORPHONE HYDROCHLORIDE 1 MG/ML
1 INJECTION, SOLUTION INTRAMUSCULAR; INTRAVENOUS; SUBCUTANEOUS EVERY 5 MIN PRN
Status: DISCONTINUED | OUTPATIENT
Start: 2025-07-07 | End: 2025-07-07 | Stop reason: HOSPADM

## 2025-07-07 RX ORDER — CELECOXIB 200 MG/1
200 CAPSULE ORAL ONCE
Status: COMPLETED | OUTPATIENT
Start: 2025-07-07 | End: 2025-07-07

## 2025-07-07 RX ORDER — HYDROCODONE BITARTRATE AND ACETAMINOPHEN 5; 325 MG/1; MG/1
1 TABLET ORAL EVERY 4 HOURS PRN
Status: DISCONTINUED | OUTPATIENT
Start: 2025-07-07 | End: 2025-07-07 | Stop reason: HOSPADM

## 2025-07-07 RX ORDER — METOCLOPRAMIDE HYDROCHLORIDE 5 MG/ML
10 INJECTION INTRAMUSCULAR; INTRAVENOUS ONCE AS NEEDED
Status: DISCONTINUED | OUTPATIENT
Start: 2025-07-07 | End: 2025-07-07 | Stop reason: HOSPADM

## 2025-07-07 RX ORDER — HYDRALAZINE HYDROCHLORIDE 20 MG/ML
5 INJECTION INTRAMUSCULAR; INTRAVENOUS EVERY 30 MIN PRN
Status: DISCONTINUED | OUTPATIENT
Start: 2025-07-07 | End: 2025-07-07 | Stop reason: HOSPADM

## 2025-07-07 RX ORDER — FENTANYL CITRATE 50 UG/ML
INJECTION, SOLUTION INTRAMUSCULAR; INTRAVENOUS AS NEEDED
Status: DISCONTINUED | OUTPATIENT
Start: 2025-07-07 | End: 2025-07-07

## 2025-07-07 RX ORDER — SUCCINYLCHOLINE CHLORIDE 20 MG/ML INJECTION SOLUTION
SOLUTION AS NEEDED
Status: DISCONTINUED | OUTPATIENT
Start: 2025-07-07 | End: 2025-07-07

## 2025-07-07 RX ORDER — PROPOFOL 10 MG/ML
INJECTION, EMULSION INTRAVENOUS AS NEEDED
Status: DISCONTINUED | OUTPATIENT
Start: 2025-07-07 | End: 2025-07-07

## 2025-07-07 RX ORDER — SODIUM CHLORIDE, SODIUM LACTATE, POTASSIUM CHLORIDE, CALCIUM CHLORIDE 600; 310; 30; 20 MG/100ML; MG/100ML; MG/100ML; MG/100ML
100 INJECTION, SOLUTION INTRAVENOUS CONTINUOUS
Status: DISCONTINUED | OUTPATIENT
Start: 2025-07-07 | End: 2025-07-07 | Stop reason: HOSPADM

## 2025-07-07 RX ORDER — MIDAZOLAM HYDROCHLORIDE 1 MG/ML
1 INJECTION, SOLUTION INTRAMUSCULAR; INTRAVENOUS ONCE AS NEEDED
Status: DISCONTINUED | OUTPATIENT
Start: 2025-07-07 | End: 2025-07-07 | Stop reason: HOSPADM

## 2025-07-07 RX ORDER — DIPHENHYDRAMINE HYDROCHLORIDE 50 MG/ML
12.5 INJECTION, SOLUTION INTRAMUSCULAR; INTRAVENOUS ONCE AS NEEDED
Status: DISCONTINUED | OUTPATIENT
Start: 2025-07-07 | End: 2025-07-07 | Stop reason: HOSPADM

## 2025-07-07 RX ORDER — LIDOCAINE HYDROCHLORIDE 20 MG/ML
INJECTION, SOLUTION EPIDURAL; INFILTRATION; INTRACAUDAL; PERINEURAL AS NEEDED
Status: DISCONTINUED | OUTPATIENT
Start: 2025-07-07 | End: 2025-07-07

## 2025-07-07 RX ORDER — LABETALOL HYDROCHLORIDE 5 MG/ML
5 INJECTION, SOLUTION INTRAVENOUS
Status: DISCONTINUED | OUTPATIENT
Start: 2025-07-07 | End: 2025-07-07 | Stop reason: HOSPADM

## 2025-07-07 RX ORDER — TERBINAFINE HYDROCHLORIDE 250 MG/1
1 TABLET ORAL
COMMUNITY
Start: 2025-06-23

## 2025-07-07 RX ORDER — SODIUM CHLORIDE, SODIUM LACTATE, POTASSIUM CHLORIDE, CALCIUM CHLORIDE 600; 310; 30; 20 MG/100ML; MG/100ML; MG/100ML; MG/100ML
INJECTION, SOLUTION INTRAVENOUS CONTINUOUS PRN
Status: DISCONTINUED | OUTPATIENT
Start: 2025-07-07 | End: 2025-07-07

## 2025-07-07 RX ORDER — ACETAMINOPHEN 325 MG/1
650 TABLET ORAL ONCE
Status: COMPLETED | OUTPATIENT
Start: 2025-07-07 | End: 2025-07-07

## 2025-07-07 RX ORDER — HYDROCODONE BITARTRATE AND ACETAMINOPHEN 5; 325 MG/1; MG/1
1 TABLET ORAL EVERY 6 HOURS PRN
Qty: 12 TABLET | Refills: 0 | Status: SHIPPED | OUTPATIENT
Start: 2025-07-07 | End: 2025-07-12

## 2025-07-07 RX ORDER — ALBUTEROL SULFATE 0.83 MG/ML
2.5 SOLUTION RESPIRATORY (INHALATION)
Status: DISCONTINUED | OUTPATIENT
Start: 2025-07-07 | End: 2025-07-07 | Stop reason: HOSPADM

## 2025-07-07 RX ADMIN — SODIUM CHLORIDE, SODIUM LACTATE, POTASSIUM CHLORIDE, AND CALCIUM CHLORIDE: .6; .31; .03; .02 INJECTION, SOLUTION INTRAVENOUS at 07:24

## 2025-07-07 RX ADMIN — PROPOFOL 140 MG: 10 INJECTION, EMULSION INTRAVENOUS at 07:28

## 2025-07-07 RX ADMIN — CELECOXIB 200 MG: 200 CAPSULE ORAL at 06:58

## 2025-07-07 RX ADMIN — Medication 140 MG: at 07:28

## 2025-07-07 RX ADMIN — ACETAMINOPHEN 650 MG: 325 TABLET ORAL at 06:57

## 2025-07-07 RX ADMIN — LIDOCAINE HYDROCHLORIDE 100 MG: 20 INJECTION, SOLUTION EPIDURAL; INFILTRATION; INTRACAUDAL; PERINEURAL at 07:28

## 2025-07-07 RX ADMIN — FENTANYL CITRATE 25 MCG: 50 INJECTION, SOLUTION INTRAMUSCULAR; INTRAVENOUS at 07:27

## 2025-07-07 RX ADMIN — Medication 6 L/MIN: at 07:45

## 2025-07-07 SDOH — HEALTH STABILITY: MENTAL HEALTH: CURRENT SMOKER: 0

## 2025-07-07 ASSESSMENT — PAIN - FUNCTIONAL ASSESSMENT
PAIN_FUNCTIONAL_ASSESSMENT: 0-10

## 2025-07-07 ASSESSMENT — PAIN SCALES - GENERAL
PAINLEVEL_OUTOF10: 3
PAINLEVEL_OUTOF10: 2
PAINLEVEL_OUTOF10: 1
PAINLEVEL_OUTOF10: 3
PAINLEVEL_OUTOF10: 0 - NO PAIN
PAINLEVEL_OUTOF10: 3
PAINLEVEL_OUTOF10: 3
PAINLEVEL_OUTOF10: 2

## 2025-07-07 ASSESSMENT — PAIN DESCRIPTION - DESCRIPTORS
DESCRIPTORS: SORE

## 2025-07-07 ASSESSMENT — COLUMBIA-SUICIDE SEVERITY RATING SCALE - C-SSRS
2. HAVE YOU ACTUALLY HAD ANY THOUGHTS OF KILLING YOURSELF?: NO
1. IN THE PAST MONTH, HAVE YOU WISHED YOU WERE DEAD OR WISHED YOU COULD GO TO SLEEP AND NOT WAKE UP?: NO
6. HAVE YOU EVER DONE ANYTHING, STARTED TO DO ANYTHING, OR PREPARED TO DO ANYTHING TO END YOUR LIFE?: NO

## 2025-07-07 NOTE — ANESTHESIA POSTPROCEDURE EVALUATION
"Patient: Laureano Elizabeth \"Juan\"    Procedure Summary       Date: 07/07/25 Room / Location: [facility] OR 06 / [facility]    Anesthesia Start: 0724 Anesthesia Stop: 0747    Procedure: LEFT KNEE MANIPULATION (Left: Knee) Diagnosis:       Contracture, left knee      (Contracture, left knee [M24.562])    Surgeons: Irving Leiva MD Responsible Provider: Coty Busby MD    Anesthesia Type: general ASA Status: 3            Anesthesia Type: general    Vitals Value Taken Time   /69 07/07/25 08:45   Temp 36.4 °C (97.5 °F) 07/07/25 08:45   Pulse 89 07/07/25 08:45   Resp 11 07/07/25 08:45   SpO2 95 % 07/07/25 08:46   Vitals shown include unfiled device data.    Anesthesia Post Evaluation    Patient location during evaluation: PACU  Patient participation: complete - patient participated  Level of consciousness: sleepy but conscious  Pain management: satisfactory to patient  Airway patency: patent  Cardiovascular status: acceptable  Respiratory status: acceptable  Hydration status: euvolemic  Postoperative Nausea and Vomiting: none        There were no known notable events for this encounter.    "

## 2025-07-07 NOTE — ANESTHESIA PREPROCEDURE EVALUATION
"Patient: Laureano Elizabeth \"Juan\"    Procedure Information       Anesthesia Start Date/Time: 07/07/25 0724    Procedure: LEFT KNEE MANIPULATION (Left: Knee) - OUTPATIENT    Location: [facility] OR  / [facility] OR    Surgeons: Irving Leiva MD            Relevant Problems   Cardiac   (+) Benign essential HTN      Musculoskeletal   (+) Unilateral primary osteoarthritis, left knee       Clinical information reviewed:   Tobacco  Allergies  Meds   Med Hx  Surg Hx   Fam Hx  Soc Hx        NPO Detail:  NPO/Void Status  Date of Last Liquid: 07/07/25  Time of Last Liquid: 0700  Date of Last Solid: 07/06/25  Time of Last Solid: 2000  Last Intake Type: Clear fluids  Time of Last Void: 0545         Physical Exam    Airway  Mallampati: III  TM distance: >3 FB  Neck ROM: full  Mouth opening: 3 or more finger widths  Comments: Short thick neck     Cardiovascular   Rhythm: regular  Rate: normal     Dental - normal exam     Pulmonary Breath sounds clear to auscultation     Abdominal - normal exam           Anesthesia Plan    History of general anesthesia?: yes  History of complications of general anesthesia?: no    ASA 3     general     The patient is not a current smoker.    intravenous induction   Anesthetic plan and risks discussed with patient.    Plan discussed with CRNA.      "

## 2025-07-07 NOTE — OP NOTE
"Operative Note     Date: 2025  OR Location: [facility] OR    Name: Laureano Elizabeth \"Juan\", : 1965, Age: 60 y.o., MRN: [id number], Sex: male    Diagnosis  Pre-op Diagnosis      * Contracture, left knee [M24.562] Post-op Diagnosis     * Contracture, left knee [M24.562]     Procedures  LEFT KNEE MANIPULATION  27633 - GA MANIPULATION KNEE JOINT UNDER GENERAL ANESTHESIA    Surgeons      * Irving Leiva - Primary    Resident/Fellow/Other Assistant:  Tuan Galvan PA-C     Staff:   Circulator: Bunny  Circulator: Shilo    Anesthesia Staff: Anesthesiologist: Coty Busby MD  CRNA: OCTAVIANO Gallardo-CRNA    Procedure Summary  Anesthesia: Anesthesia type not filed in the log.  ASA: ASA status not filed in the log.  Estimated Blood Loss: 0 mL  Intra-op Medications: Administrations occurring from 0730 to 0755 on 25:  * No intraprocedure medications in log *           Anesthesia Record               Intraprocedure I/O Totals       None           Specimen: No specimens collected                Implants:     Findings:  Pre-operative range of motion: 0 degrees to 100 degrees    Indications:     The patient was seen in the preoperative area. The risks, benefits, complications, treatment options, non-operative alternatives, expected recovery and outcomes were discussed with the patient. The possibilities of reaction to medication, pulmonary aspiration, injury to surrounding structures, bleeding, recurrent infection, the need for additional procedures, failure to diagnose a condition, and creating a complication requiring transfusion or operation were discussed with the patient. The patient concurred with the proposed plan, giving informed consent.  The site of surgery was properly noted/marked if necessary per policy. The patient has been actively warmed in preoperative area.    Patient was noted to have stiffness after total knee arthroplasty.  There was no evidence of infection or significant effusion.  We had " concern about functional status and permanent contracture without intervention.  We discussed the risks and benefits of manipulation we discussed the difficulty in obtaining additional extension, we discussed the small possibility of patellar tendon avulsion as well as fracture as well as the possibility of incomplete improvement in motion.    Procedure Details:     The patient was transported to the operating room and underwent anesthesia.  The patient was positioned supine on the operative table.    A timeout was completed and antibiotic administration was in accordance with standard protocol.      The patient had an exam under anesthesia confirming the motion listed above in operative findings.  There was no coronal or sagittal laxity noted.  After the anesthesia the knee was bent and extended with a gradual steady pressure.  Audible disruption of the fibrotic tissue was noted.  The range of motion after manipulation was estimated to be 0 degrees to 130 degrees.  There were no complications and the disruption was completed without issue.  Pictures were taken before and after the manipulation.      I was present and participated in the entire procedure. The Physician Assistant participated in all critical elements of the procedure under my direct supervision. There were no qualified residents available to assist.      Complications:  None; patient tolerated the procedure well.    Disposition: PACU - hemodynamically stable.  Condition: stable         Irving Leiva  Phone Number: [phone]

## 2025-07-07 NOTE — DISCHARGE INSTRUCTIONS
Post-Operative Instructions - Knee Manipulation under Anesthesia   Dr. Irving Leiva      Activity / Swelling:  Weight bear as tolerated immediately.  You will need crutches or walker if a nerve block was performed (occasionally a brace is used).  Once strength returns to quadriceps muscle (generally day after surgery) normal walking can resume.   Ice as needed, avoid direct application to skin.         You may begin to bend your knee and straighten your knee within its normal range of motion TODAY, as well as performing such exercises as ankle pumps, straight leg raises, gluteal sets, and any other exercises that may have been given to you by Dr. Leiva and your therapist. Range of motion exercises should be performed three times per day!!!   Motion exercises during nerve block are encouraged.    Diet:  Gradually resume your normal diet as tolerated following surgery.  The evening of your surgical day, begin with liquids and/or light foods.  If you are feeling well enough the next morning, progress to your normal eating patterns    Dressing care /Showering / Hygiene:  Okay to shower immediately as there is no incision    Medications:   Pain:  You will be given a prescription for pain medication after your surgery.  It should be taken as needed only.  The goal is to discontinue it as quickly as possible.  DO NOT drive or operate heavy machinery while taking this medication as it will make you drowsy.      You may want to consider also taking over-the-counter stool softener and/or laxative (Colace, Senekot or Dulcolax). These medications should be taken as directed and only short term.    Prevention of Blood Clots:  81 mg of aspirin (over the counter) to start the day after the procedure for 1 week unless contraindicated.    Physical therapy:         Should be resumed as soon as possible after manipulation (ideally the day after).    Follow-up:         Generally 14-21 days post-op.      Call with any concerns,  especially falls or severe pain.  Phone: [phone]

## 2025-07-09 ENCOUNTER — TREATMENT (OUTPATIENT)
Dept: PHYSICAL THERAPY | Facility: CLINIC | Age: 60
End: 2025-07-09
Payer: COMMERCIAL

## 2025-07-09 DIAGNOSIS — Z96.652 TOTAL KNEE REPLACEMENT STATUS, LEFT: ICD-10-CM

## 2025-07-09 DIAGNOSIS — M17.12 UNILATERAL PRIMARY OSTEOARTHRITIS, LEFT KNEE: ICD-10-CM

## 2025-07-09 PROCEDURE — 97110 THERAPEUTIC EXERCISES: CPT | Mod: GP | Performed by: PHYSICAL THERAPIST

## 2025-07-09 PROCEDURE — 97530 THERAPEUTIC ACTIVITIES: CPT | Mod: GP | Performed by: PHYSICAL THERAPIST

## 2025-07-09 PROCEDURE — 97140 MANUAL THERAPY 1/> REGIONS: CPT | Mod: GP | Performed by: PHYSICAL THERAPIST

## 2025-07-09 ASSESSMENT — PAIN - FUNCTIONAL ASSESSMENT: PAIN_FUNCTIONAL_ASSESSMENT: 0-10

## 2025-07-09 ASSESSMENT — PAIN DESCRIPTION - DESCRIPTORS: DESCRIPTORS: TIGHTNESS

## 2025-07-09 ASSESSMENT — PAIN SCALES - GENERAL: PAINLEVEL_OUTOF10: 0 - NO PAIN

## 2025-07-09 NOTE — PROGRESS NOTES
"      Physical Therapy Treatment    Patient Name: Laureano Elizabeth \"Juan\"  MRN: [id number]  Today's Date: 7/9/2025  Time Calculation  Start Time: 0256  Stop Time: 0335  Time Calculation (min): 39 min  PT Therapeutic Procedures Time Entry  Therapeutic Exercise Time Entry: 15  Manual Therapy Time Entry: 9  Therapeutic Activity Time Entry: 15     Insurance   Visit #6 of 16  OA LEFT KNEE M17.12 (L TKR 5/16/25) ; [employer]: 2500/5000 DED IS MET FOR 2025 // 6000/12000 OOP // 30% COINSUR // 0 COPAY // 20V CY OUTPATIENT AND OFFICE COMBINED // PRIOR AUTH IS NOT REQ PER [email or web address] REF#: [id number]  Current Problem:  1. Total knee replacement status, left  Follow Up In Physical Therapy      2. Unilateral primary osteoarthritis, left knee  Follow Up In Physical Therapy          Subjective: Patient reports he saw Dr. Leiva for follow up and then had a manipulation done on 7/7/25. States MD was able to get him to 118 deg knee flexion under manipulation. He denies pain today. The patient feels he was doing pretty good as far as tasks around the house prior to this.     Pain:  Pain Assessment: 0-10  0-10 (Numeric) Pain Score: 0 - No pain  Pain Location: Knee  Pain Orientation: Left  Pain Radiating Towards: Leg  Pain Descriptors: Tightness    Objective:  L Knee   Active Knee flexion: 102 deg; AAROM:     Precautions  Precautions  Precautions Comment: TKR protocol  Treatments:  Therapeutic Exercise 47250: Unit(s)-1 -16 minutes  Recumbent  Bike -seat 11 x 5 min Level 1  Supine Heel slide into flexion/extension x 10\" hold x 10   Standing Lunge Stretch on Block for knee flexion: 10\" x 4 (needed a break)  Seated HS s: 30\"x3  LAQ w/ Knee flexion OP: x 20     Therapeutic Activity 04536: 2 units 25 minutes  TG Lvl 5 2 cords: 2 x 10 (into Knee flexion stretch)  STS from lower table x 20 with hip hinge    Manual Therapy 51996: Unit(s)    Assessment: Patient had manipulation performed on Monday. He is resistant to push through pain " to achieve further knee flexion when on bike despite continued encouragement. Patient would benefit from manual therapy to improve ROM however his overall tolerance is poor and resists due to pain in knee. Utilized self mobilization /AAROM for knee flexion exercises as patient tends to tolerate being in control of his ROM vs manual assisted techniques. Able to achieve 102 deg actively and 104 deg with active assist.     Plan: Continue 2-3 x week work to improve AROM, strength and  improve gait.

## 2025-07-11 ENCOUNTER — TREATMENT (OUTPATIENT)
Dept: PHYSICAL THERAPY | Facility: CLINIC | Age: 60
End: 2025-07-11
Payer: COMMERCIAL

## 2025-07-11 DIAGNOSIS — Z96.652 TOTAL KNEE REPLACEMENT STATUS, LEFT: ICD-10-CM

## 2025-07-11 DIAGNOSIS — M17.12 UNILATERAL PRIMARY OSTEOARTHRITIS, LEFT KNEE: ICD-10-CM

## 2025-07-11 PROCEDURE — 97530 THERAPEUTIC ACTIVITIES: CPT | Mod: GP | Performed by: PHYSICAL THERAPIST

## 2025-07-11 PROCEDURE — 97110 THERAPEUTIC EXERCISES: CPT | Mod: GP | Performed by: PHYSICAL THERAPIST

## 2025-07-11 ASSESSMENT — PAIN DESCRIPTION - DESCRIPTORS: DESCRIPTORS: TIGHTNESS

## 2025-07-11 ASSESSMENT — PAIN SCALES - GENERAL: PAINLEVEL_OUTOF10: 0 - NO PAIN

## 2025-07-11 ASSESSMENT — PAIN - FUNCTIONAL ASSESSMENT: PAIN_FUNCTIONAL_ASSESSMENT: 0-10

## 2025-07-11 NOTE — PROGRESS NOTES
"      Physical Therapy Treatment    Patient Name: Laureano Elizabeth \"Juan\"  MRN: [id number]  Today's Date: 7/11/2025  Time Calculation  Start Time: 1122  Stop Time: 1200  Time Calculation (min): 38 min  PT Therapeutic Procedures Time Entry  Therapeutic Exercise Time Entry: 10  Therapeutic Activity Time Entry: 28     Insurance   Visit #7 of 16  OA LEFT KNEE M17.12 (L TKR 5/16/25) ; [employer]: 2500/5000 DED IS MET FOR 2025 // 6000/12000 OOP // 30% COINSUR // 0 COPAY // 20V CY OUTPATIENT AND OFFICE COMBINED // PRIOR AUTH IS NOT REQ PER [employer] REF#: [id number]  Current Problem:  1. Total knee replacement status, left  Follow Up In Physical Therapy      2. Unilateral primary osteoarthritis, left knee  Follow Up In Physical Therapy          Subjective: Patient reports he feels he is doing better than last session. He states he does not have pain but does have stiffness and swelling in the knee.    Pain:  Pain Assessment: 0-10  0-10 (Numeric) Pain Score: 0 - No pain  Pain Descriptors: Tightness    Objective:  L Knee   Active Knee flexion: 104 deg; AAROM:     Precautions  Precautions  Precautions Comment: TKR protocol  Treatments:  Therapeutic Exercise 12648: Unit(s)-1 -10 minutes  Recumbent  Bike -seat 11 x 6 min Level 1  Supine Heel slide into flexion/extension x 10\" hold x 10   LAQ w/ Knee flexion OP: x 20       Therapeutic Activity 28336: 2 units 28 minutes  TG Lvl 5 2 cords: 2 x 10 (into Knee flexion stretch)  STS from lower table x 20 with hip hinge  Step Ups: forward/lateral 8\" L LE only  STS Low table    Manual Therapy 90298: Unit(s)    Assessment: Patient continues to ambulate with cane when out of the house due to fear of falling. He is not using within the house. He is able to make full rotation easily on recumbent bike this date without need to ease into it. Increased resistance on TG this date for strengthening and increased stretch into flexion as patient continue to remain cautious and " resistive with manual stretching/ROM. He was able to negotiate stairs (up) reciprocally the other day but continues with step-to pattern on descent. Patient requires lots of encouragement and cuing throughout session to perform exercises correctly and without compensatory strategies.     Plan: Continue 2-3 x week work to improve AROM, strength and  improve gait.

## 2025-07-16 ENCOUNTER — TREATMENT (OUTPATIENT)
Dept: PHYSICAL THERAPY | Facility: CLINIC | Age: 60
End: 2025-07-16
Payer: COMMERCIAL

## 2025-07-16 DIAGNOSIS — Z96.652 TOTAL KNEE REPLACEMENT STATUS, LEFT: ICD-10-CM

## 2025-07-16 DIAGNOSIS — M17.12 UNILATERAL PRIMARY OSTEOARTHRITIS, LEFT KNEE: ICD-10-CM

## 2025-07-16 PROCEDURE — 97110 THERAPEUTIC EXERCISES: CPT | Mod: GP | Performed by: PHYSICAL THERAPIST

## 2025-07-16 PROCEDURE — 97530 THERAPEUTIC ACTIVITIES: CPT | Mod: GP | Performed by: PHYSICAL THERAPIST

## 2025-07-16 ASSESSMENT — PAIN SCALES - GENERAL: PAINLEVEL_OUTOF10: 0 - NO PAIN

## 2025-07-16 ASSESSMENT — PAIN - FUNCTIONAL ASSESSMENT: PAIN_FUNCTIONAL_ASSESSMENT: 0-10

## 2025-07-16 ASSESSMENT — PAIN DESCRIPTION - DESCRIPTORS: DESCRIPTORS: TIGHTNESS

## 2025-07-16 NOTE — PROGRESS NOTES
"      Physical Therapy Treatment    Patient Name: Laureano Elizabeth \"Juan\"  MRN: [id number]  Today's Date: 7/16/2025  Time Calculation  Start Time: 0252  Stop Time: 0333  Time Calculation (min): 41 min  PT Therapeutic Procedures Time Entry  Therapeutic Exercise Time Entry: 13  Therapeutic Activity Time Entry: 28     Insurance   Visit #8 of 16  OA LEFT KNEE M17.12 (L TKR 5/16/25) ; [employer]: 2500/5000 DED IS MET FOR 2025 // 6000/12000 OOP // 30% COINSUR // 0 COPAY // 20V CY OUTPATIENT AND OFFICE COMBINED // PRIOR AUTH IS NOT REQ PER [employer] REF#: [id number]  Current Problem:  1. Total knee replacement status, left  Follow Up In Physical Therapy      2. Unilateral primary osteoarthritis, left knee  Follow Up In Physical Therapy        Subjective: Patient reports he feels he is doing better than last session. He states he does not have pain but does have stiffness and swelling in the knee.    Pain:  Pain Assessment: 0-10  0-10 (Numeric) Pain Score: 0 - No pain  Pain Descriptors: Tightness    Objective:  L Knee   AAROM Knee flexion: 96 deg with passive OP    Precautions  Precautions  Precautions Comment: TKR protocol  Treatments:  Therapeutic Exercise 72664: Unit(s)-1 -13 minutes  Recumbent  Bike -seat 11 x 6 min Level 1  Supine Heel slide into flexion/extension x 10\" hold x 10   LAQ w/ Knee flexion OP: x 20     Therapeutic Activity 65939: 2 units 28 minutes  TG Lvl 5 2 cords: 3 x 10 (into Knee flexion stretch)  Lateral Tap Down: 3\" step : 2 x 10   STS from lower table x 20 with hip hinge  SLS 10\" x 10   Side Stepping with TB at parallel: (yellow TB) x 2 length of barre    Manual Therapy 71516: Unit(s)    Assessment: Patient progressing slowly with overall functional mobility and strength but continues to walk with compensated/altered gait and slow pace. He is improving in his strength but continues to be limited with his overall ROM. Flexion less than last session and with much resistance when applying " manual OP. Stressed the importance of working on flexion mobility outside of therapy.     Plan: Continue 2-3 x week work to improve AROM, strength and  improve gait.

## 2025-07-18 ENCOUNTER — TREATMENT (OUTPATIENT)
Dept: PHYSICAL THERAPY | Facility: CLINIC | Age: 60
End: 2025-07-18
Payer: COMMERCIAL

## 2025-07-18 DIAGNOSIS — Z96.652 TOTAL KNEE REPLACEMENT STATUS, LEFT: ICD-10-CM

## 2025-07-18 DIAGNOSIS — M17.12 UNILATERAL PRIMARY OSTEOARTHRITIS, LEFT KNEE: ICD-10-CM

## 2025-07-18 PROCEDURE — 97110 THERAPEUTIC EXERCISES: CPT | Mod: GP | Performed by: PHYSICAL THERAPIST

## 2025-07-18 ASSESSMENT — PAIN SCALES - GENERAL: PAINLEVEL_OUTOF10: 0 - NO PAIN

## 2025-07-18 ASSESSMENT — PAIN - FUNCTIONAL ASSESSMENT: PAIN_FUNCTIONAL_ASSESSMENT: 0-10

## 2025-07-18 NOTE — PROGRESS NOTES
"      Physical Therapy Treatment    Patient Name: Laureano Elizabeth \"Juan\"  MRN: [id number]  Today's Date: 7/18/2025  Time Calculation  Start Time: 1120  Stop Time: 1200  Time Calculation (min): 40 min  PT Therapeutic Procedures Time Entry  Therapeutic Exercise Time Entry: 40     Insurance   Visit #9 of 16  OA LEFT KNEE M17.12 (L TKR 5/16/25) ; [employer] BCBS: 2500/5000 DED IS MET FOR 2025 // 6000/12000 OOP // 30% COINSUR // 0 COPAY // 20V CY OUTPATIENT AND OFFICE COMBINED // PRIOR AUTH IS NOT REQ PER [employer] REF#: [id number]  Current Problem:  1. Total knee replacement status, left  Follow Up In Physical Therapy      2. Unilateral primary osteoarthritis, left knee  Follow Up In Physical Therapy        Subjective: Patient reports he feels okay. Denies pain in the surgical knee but tender on the non-surgical knee today.     Pain:  Pain Assessment: 0-10  0-10 (Numeric) Pain Score: 0 - No pain    Objective:  L Knee   AAROM Knee flexion: 99 deg with manual OP (resists overpressure) // After manual stretching able to get 101 actively   Active knee extension: lacking 6 deg    Precautions  Precautions  Precautions Comment: TKR protocol    Treatments:  Therapeutic Exercise 13622: Unit(s)-1 -13 minutes  Recumbent  Bike -seat 11 x 6 min Level 1 Seat level 10 (unable to make full rotation)  Supine Heel slide into flexion/extension x 10\" hold x 4 minutes  LAQ w/ Knee flexion OP: x 20 (manual)    Therapeutic Activity 61137: 2 units 28 minutes  TG Lvl 6 2 cords: 3 x 10 (into Knee flexion stretch)  Lateral Tap Down: 3\" step : 2 x 10   STS from to low chair x 10  // Squat Taps to low chair x 10     Manual Therapy 40368: Unit(s)    Assessment: Patient progressing slowly with overall functional mobility and strength due to self-limiting behavior. Educated patient again today on the importance of allowing therapist to push through painful ROM in order to improve ROM for increased safety and tolerance to daily tasks. He was " able to reach 101 degrees actively after manual OP with pain at end range.     Plan: Continue 2-3 x week work to improve AROM, strength and  improve gait.

## 2025-07-23 ENCOUNTER — TREATMENT (OUTPATIENT)
Dept: PHYSICAL THERAPY | Facility: CLINIC | Age: 60
End: 2025-07-23
Payer: COMMERCIAL

## 2025-07-23 DIAGNOSIS — M17.12 UNILATERAL PRIMARY OSTEOARTHRITIS, LEFT KNEE: ICD-10-CM

## 2025-07-23 DIAGNOSIS — Z96.652 TOTAL KNEE REPLACEMENT STATUS, LEFT: ICD-10-CM

## 2025-07-23 PROCEDURE — 97530 THERAPEUTIC ACTIVITIES: CPT | Mod: GP | Performed by: PHYSICAL THERAPIST

## 2025-07-23 PROCEDURE — 97110 THERAPEUTIC EXERCISES: CPT | Mod: GP | Performed by: PHYSICAL THERAPIST

## 2025-07-23 ASSESSMENT — PAIN - FUNCTIONAL ASSESSMENT: PAIN_FUNCTIONAL_ASSESSMENT: 0-10

## 2025-07-23 ASSESSMENT — PAIN SCALES - GENERAL: PAINLEVEL_OUTOF10: 0 - NO PAIN

## 2025-07-23 ASSESSMENT — PAIN DESCRIPTION - DESCRIPTORS: DESCRIPTORS: TIGHTNESS

## 2025-07-23 NOTE — PROGRESS NOTES
"      Physical Therapy Treatment    Patient Name: Laureano Elizabeth \"Juan\"  MRN: [id number]  Today's Date: 7/23/2025  Time Calculation  Start Time: 0245  Stop Time: 0327  Time Calculation (min): 42 min  PT Therapeutic Procedures Time Entry  Therapeutic Exercise Time Entry: 19  Therapeutic Activity Time Entry: 23     Insurance   Visit #10 of 16  OA LEFT KNEE M17.12 (L TKR 5/16/25) ; [employer]: 2500/5000 DED IS MET FOR 2025 // 6000/12000 OOP // 30% COINSUR // 0 COPAY // 20V CY OUTPATIENT AND OFFICE COMBINED // PRIOR AUTH IS NOT REQ PER [employer] REF#: [id number]  Current Problem:  1. Total knee replacement status, left  Follow Up In Physical Therapy      2. Unilateral primary osteoarthritis, left knee  Follow Up In Physical Therapy        Subjective: Patient reports he feels okay. Denies pain but reports tightness in back of knee calf this date.      Pain:  Pain Assessment: 0-10  0-10 (Numeric) Pain Score: 0 - No pain  Pain Descriptors: Tightness (in calf)    Objective:  L Knee   AAROM Knee flexion: 105 deg with manual OP (resists overpressure) // After manual stretching able to get 101 actively   Active knee extension: lacking 3 deg  Shuffling gait pattern    Precautions  Precautions  Precautions Comment: TKR protocol    Treatments:  Therapeutic Exercise 31218: Unit(s)-1 -19 minutes  Recumbent  Bike -seat 11 x 6 min Level 1 Seat level 10 (unable to make full rotation)  Supine Heel slide into flexion/extension x 10\" hold x 4 minutes  LAQ w/ Knee flexion OP: x 20 (manual)    Therapeutic Activity 04572: 2 units 23 minutes  TG Lvl 6 2 cords: 3 x 10 (into Knee flexion stretch)  TG Lvl 5 0 cords SL 2 x 10   Step Ups: 5\"   Step up /Overs: 5\" x 10 (L LE)  STS from to low chair  2 x 10      Manual Therapy 34312: Unit(s)    Assessment: Patient able to make full rotation on recumbent bike at seat level 9 today which he was previously unabel to make full rotation at 10. Continues to progress slowly overall with " therapy. His gait pattern remains fair despite improving strength and mobility. He achieves best ROM today that he has since surgery of 3-105 with active assist into flexion.    Plan: Continue 2-3 x week work to improve AROM, strength and  improve gait.

## 2025-07-25 ENCOUNTER — APPOINTMENT (OUTPATIENT)
Dept: PHYSICAL THERAPY | Facility: CLINIC | Age: 60
End: 2025-07-25
Payer: COMMERCIAL

## 2025-07-28 ENCOUNTER — APPOINTMENT (OUTPATIENT)
Dept: SURGERY | Facility: CLINIC | Age: 60
End: 2025-07-28
Payer: COMMERCIAL

## 2025-07-28 DIAGNOSIS — M79.89 SOFT TISSUE MASS: ICD-10-CM

## 2025-07-28 LAB
NON-UH HIE BUN: 17 MG/DL (ref 9–23)
NON-UH HIE CREATININE: 0.9 MG/DL (ref 0.6–1.1)
NON-UH HIE GFR AA: >60
NON-UH HIE GLOMERULAR FILTRATION RATE: >60 ML/MIN/1.73M?

## 2025-07-28 PROCEDURE — 99204 OFFICE O/P NEW MOD 45 MIN: CPT | Performed by: SURGERY

## 2025-07-28 PROCEDURE — 1036F TOBACCO NON-USER: CPT | Performed by: SURGERY

## 2025-07-28 NOTE — PROGRESS NOTES
"Subjective   Patient ID: Laureano Elizabeth \"Fortunato" is a 60 y.o. male who presents for Mass (Right metacarpal side, post iv needle insertion, c/o pain w/ bending it).    HPI had a recent orthopedic surgery.  At that time IV was placed.  And patient noticed lump on the left hand, supposedly area of IV placement  Review of Systems musculoskeletal system with pain in the left hand.  Review of all other 10 systems negative  Physical Exam Pupils equal bilaterally, oral mucosa moist, bilateral breath sounds, clear to auscultation, regular rhythm and rate, no murmurs, abdomen is soft, nontender, nondistended, no palpable hernias, palpable peripheral pulse, no focal neurological motor deficits.  ENT exam within normal limits.  Palpable soft tissue mass in the left hand, dorsal surface, size approximately 6 cm.  Even though it may represent a hematoma, I cannot rule out possibility of soft tissue mass./Neoplastic process.      Objective     No diagnosis found.   Problem List[1]   RX Allergies[2]   Medication Documentation Review Audit       Reviewed by Mehran Bunch MD (Physician) on 07/28/25 at 0924      Medication Order Taking? Sig Documenting Provider Last Dose Status   chlorhexidine (Peridex) 0.12 % solution [id number] No 15 milliliter(s) orally once a day for 2 doses 15 ml  the night before surgery and 15 ml morning of surgery - swish for 30 seconds -DO NOT SWALLOW, SPIT OUT OCTAVIANO Mcintosh-CNP 5/16/2025 Morning Active   cyclobenzaprine (Flexeril) 10 mg tablet [id number] No Take 1 tablet (10 mg) by mouth 3 times a day as needed for muscle spasms. Garret Vela MD Unknown Active   docusate sodium (Colace) 100 mg capsule [id number] No Take 1 capsule (100 mg) by mouth 2 times a day.   Patient not taking: Reported on 7/7/2025    Garret Vela MD Not Taking Active   fexofenadine (Allegra) 180 mg tablet [id number] Yes Take 1 tablet (180 mg) by mouth once daily as needed. Historical Provider, MD 7/6/2025 Active "   furosemide (Lasix) 20 mg tablet [id number] No Take two tabs. One day alternating with one tab. The next   Patient not taking: Reported on 7/7/2025    Pako Cao MD Not Taking Active   meloxicam (Mobic) 15 mg tablet [id number] Yes Take 1 tablet (15 mg) by mouth once daily. Tuan Galvan PA-C 7/6/2025 Active   metoprolol succinate XL (Toprol-XL) 25 mg 24 hr tablet [id number] No Take 0.5 tablets (12.5 mg) by mouth once daily. Do not crush or chew.   Patient not taking: Reported on 7/7/2025    Pako Cao MD Not Taking Active   montelukast (Singulair) 10 mg tablet [id number] No Take 1 tablet (10 mg) by mouth once daily at bedtime.   Patient not taking: Reported on 7/7/2025    Historical Provider, MD Not Taking Active   nebivolol (Bystolic) 5 mg tablet [id number] Yes Take 1 tablet (5 mg) by mouth once daily. Historical Provider, MD 7/6/2025 Active   oxyCODONE (Roxicodone) 5 mg immediate release tablet [id number] No Take 1 tablet (5 mg) by mouth every 4 hours if needed for moderate pain (4 - 6).   Patient not taking: Reported on 7/7/2025    Garret Vela MD Not Taking Active   terbinafine (LamISIL) 250 mg tablet [id number] No Take 1 tablet (250 mg) by mouth early in the morning.. Historical Provider, MD 7/6/2025 Active   valsartan (Diovan) 80 mg tablet [id number] Yes Take 1 tablet (80 mg) by mouth once daily. Historical Provider, MD 7/6/2025 Active                    Medical History[3]  Tobacco Use History[4]  Family History[5]   Surgical History[6]    Assessment/Plan   With a palpable soft tissue mass on the left hand.  Patient has indication for CT scan of the left hand to differentiate hematoma from palpable soft tissue mass/neoplastic process.  Then follow-up in office to discuss further management      Mehran Bunch MD          [1]   Patient Active Problem List  Diagnosis    Benign essential HTN    BMI 40.0-44.9, adult (Multi)    Never smoked tobacco    Bilateral leg edema    Mild concentric left  ventricular hypertrophy (LVH)    Unilateral primary osteoarthritis, left knee    Total knee replacement status, left    Contracture, left knee   [2]   Allergies  Allergen Reactions    Clarithromycin GI Upset     N/V, bad taste   [3]   Past Medical History:  Diagnosis Date    Asthma     HTN (hypertension)    [4]   Social History  Tobacco Use   Smoking Status Never   Smokeless Tobacco Never   [5] No family history on file.  [6]   Past Surgical History:  Procedure Laterality Date    APPENDECTOMY      in tenth grade    KNEE ARTHROPLASTY Left     OTHER SURGICAL HISTORY      UPPER ENDOSCOPY TO REMOVE LODGED FOOD IN ESOPHAGUS

## 2025-07-29 DIAGNOSIS — G89.18 POST-OP PAIN: ICD-10-CM

## 2025-07-29 DIAGNOSIS — M17.12 PRIMARY OSTEOARTHRITIS OF LEFT KNEE: ICD-10-CM

## 2025-07-29 RX ORDER — MELOXICAM 15 MG/1
15 TABLET ORAL DAILY
Qty: 30 TABLET | Refills: 0 | Status: SHIPPED | OUTPATIENT
Start: 2025-07-29

## 2025-07-30 ENCOUNTER — TREATMENT (OUTPATIENT)
Dept: PHYSICAL THERAPY | Facility: CLINIC | Age: 60
End: 2025-07-30
Payer: COMMERCIAL

## 2025-07-30 DIAGNOSIS — M17.12 UNILATERAL PRIMARY OSTEOARTHRITIS, LEFT KNEE: ICD-10-CM

## 2025-07-30 DIAGNOSIS — Z96.652 TOTAL KNEE REPLACEMENT STATUS, LEFT: ICD-10-CM

## 2025-07-30 PROCEDURE — 97530 THERAPEUTIC ACTIVITIES: CPT | Mod: GP | Performed by: PHYSICAL THERAPIST

## 2025-07-30 PROCEDURE — 97110 THERAPEUTIC EXERCISES: CPT | Mod: GP | Performed by: PHYSICAL THERAPIST

## 2025-07-30 PROCEDURE — 97140 MANUAL THERAPY 1/> REGIONS: CPT | Mod: GP | Performed by: PHYSICAL THERAPIST

## 2025-07-30 ASSESSMENT — PAIN SCALES - GENERAL: PAINLEVEL_OUTOF10: 0 - NO PAIN

## 2025-07-30 ASSESSMENT — PAIN - FUNCTIONAL ASSESSMENT: PAIN_FUNCTIONAL_ASSESSMENT: 0-10

## 2025-07-30 NOTE — PROGRESS NOTES
"      Physical Therapy Treatment    Patient Name: Laureano Elizabeth \"Juan\"  MRN: [id number]  Today's Date: 7/30/2025  Time Calculation  Start Time: 0245  Stop Time: 0330  Time Calculation (min): 45 min  PT Therapeutic Procedures Time Entry  Therapeutic Exercise Time Entry: 13  Manual Therapy Time Entry: 12  Therapeutic Activity Time Entry: 20     Insurance   Visit #11 of 16  OA LEFT KNEE M17.12 (L TKR 5/16/25) ; [employer]BS: 2500/5000 DED IS MET FOR 2025 // 6000/12000 OOP // 30% COINSUR // 0 COPAY // 20V CY OUTPATIENT AND OFFICE COMBINED // PRIOR AUTH IS NOT REQ PER [employer] REF#: [id number]  Current Problem:  1. Total knee replacement status, left  Follow Up In Physical Therapy      2. Unilateral primary osteoarthritis, left knee  Follow Up In Physical Therapy        Subjective: Patient reports he feels okay. Denies pain but reports tightness in back of knee calf this date.      Pain:  Pain Assessment: 0-10  0-10 (Numeric) Pain Score: 0 - No pain    Objective:  L Knee   AAROM Knee flexion: 105 deg with manual OP (resists overpressure) // After manual stretching able to get 101 actively   Active knee extension: lacking 3 deg  Shuffling gait pattern    Precautions  Precautions  Precautions Comment: TKR protocol    Treatments:  Therapeutic Exercise 98512: Unit(s)-1 -13 minutes  Recumbent  Bike -seat 11 x 6 min Level 1 Seat level 10 (unable to make full rotation)  Standing Lunge Stretch: for knee flexion 5\" x 10 ea    Access Code: [email or web address]  URL: [email or web address]  Date: 07/30/2025  Prepared by: Cindy Kulkarni    Exercises  - Standing Gastroc Stretch at Counter  - 1 x daily - 7 x weekly - 3 sets - 30 hold  - Seated Hamstring Stretch  - 1 x daily - 7 x weekly - 3 sets - 30 hold    Therapeutic Activity 09135: 2 units 20 minutes  TG Lvl 6 2 cords: 3 x 10 (into Knee flexion stretch)  TG Lvl 5 0 cords SL 2 x 10   Step Ups: 5\"   Step up /Overs: 5\" x 10 (L LE)  Squat taps to table 2 x 10  " "    Manual Therapy 47399: Unit(s) 1 unit 12 minutes  Supine Heel slide into flexion/extension + Active quad set x 10\" old x 4 minutes    Assessment: Patient continues to use cane for long distances when walking. His walking/gait is still abnormal with shuffling pattern despite cuing for improved technique. He continues to be able to make full rotation on recumbent bike at seat level 8 today. He presents with medial knee translation when on TG requiring cuing to correct. He continues to utilize contralateral LE during step ups and fair eccentric control with step downs with slight trunk rotation for compensation but performs safely. Cuing to ensure entire foot on step with exercises. Able to reach 110 deg passively today.     Plan: Continue 2-3 x week work to improve AROM, strength and  improve gait.      "

## 2025-08-05 ENCOUNTER — OFFICE VISIT (OUTPATIENT)
Dept: ORTHOPEDIC SURGERY | Facility: CLINIC | Age: 60
End: 2025-08-05
Payer: COMMERCIAL

## 2025-08-05 DIAGNOSIS — G89.18 POST-OP PAIN: Primary | ICD-10-CM

## 2025-08-05 PROCEDURE — 99212 OFFICE O/P EST SF 10 MIN: CPT | Performed by: STUDENT IN AN ORGANIZED HEALTH CARE EDUCATION/TRAINING PROGRAM

## 2025-08-05 PROCEDURE — 99024 POSTOP FOLLOW-UP VISIT: CPT | Performed by: STUDENT IN AN ORGANIZED HEALTH CARE EDUCATION/TRAINING PROGRAM

## 2025-08-05 NOTE — PROGRESS NOTES
History of Present Illness:  Patient returns today endorsing minimal pain.  Patient notes improving functional status.  He has improved status post manipulation under anesthesia, able to achieve about 110 degrees of flexion with physical therapy.  He is very close to full extension as well.  Prior to manipulation he was maxing out about 95 degrees of flexion.  In the operating room we are able to achieve 130 degrees of flexion.     He is also beginning to have some right knee problems, may like to address this at a later appointment.    Physical Examination:  Well healed incision   ROM: Less than 5-1 05  No laxity to varus / valgus stress  + Plantar/dorsiflexion  Negative Lucero test    Assessment:  Patient status post left total knee arthroplasty and manipulation under anesthesia, doing well    Plan:  Discussed improvement in strength, swelling over the course of the first year post op.  Discussed return to activities and activities to avoid.  Dental prophylaxis discussed.  Encouraged more physical therapy for the left knee, keep working on flexion.  We feel he is several more degrees to gain.  Follow-up:  Not needed at this time    Reviewed that he can make an appointment for the right knee whenever he would like, for now recommended conservative measures.  Rest, ice, anti-inflammatories and bracing.    Tuan Galvan PA-C

## 2025-08-12 DIAGNOSIS — M79.89 SOFT TISSUE MASS: Primary | ICD-10-CM

## 2025-08-18 ENCOUNTER — APPOINTMENT (OUTPATIENT)
Dept: SURGERY | Facility: CLINIC | Age: 60
End: 2025-08-18
Payer: COMMERCIAL

## 2025-08-18 DIAGNOSIS — M65.949 TENOSYNOVITIS OF HAND: Primary | ICD-10-CM

## 2025-08-18 DIAGNOSIS — M71.30 SYNOVIAL BURSA CYST: ICD-10-CM

## 2025-08-18 PROCEDURE — 99214 OFFICE O/P EST MOD 30 MIN: CPT | Performed by: PHYSICIAN ASSISTANT

## 2025-08-19 ENCOUNTER — TELEPHONE (OUTPATIENT)
Dept: ORTHOPEDIC SURGERY | Facility: CLINIC | Age: 60
End: 2025-08-19
Payer: COMMERCIAL

## 2025-08-19 DIAGNOSIS — Z96.652 TOTAL KNEE REPLACEMENT STATUS, LEFT: ICD-10-CM

## 2025-08-19 RX ORDER — AMOXICILLIN 500 MG/1
CAPSULE ORAL
Qty: 4 CAPSULE | Refills: 5 | Status: SHIPPED | OUTPATIENT
Start: 2025-08-19

## 2025-08-29 DIAGNOSIS — M17.12 PRIMARY OSTEOARTHRITIS OF LEFT KNEE: ICD-10-CM

## 2025-08-29 DIAGNOSIS — G89.18 POST-OP PAIN: ICD-10-CM

## 2025-08-29 RX ORDER — MELOXICAM 15 MG/1
15 TABLET ORAL DAILY
Qty: 30 TABLET | Refills: 0 | Status: SHIPPED | OUTPATIENT
Start: 2025-08-29

## (undated) DEVICE — WOUND SYSTEM, DEBRIDEMENT & CLEANING, O.R DUOPAK

## (undated) DEVICE — GLOVE, SURGICAL, PROTEXIS PI W/NEU-THERA, 8.0, PF, LF

## (undated) DEVICE — SUTURE, VICRYL, 2-0, 36 IN, CT-1, UNDYED

## (undated) DEVICE — TRACKING KIT, TM KNEE PROCEDURES, VIZADISC

## (undated) DEVICE — PIN, BONE, 4MM X 110MM, STERILE

## (undated) DEVICE — GLOVE, SURGICAL, PROTEXIS PI , 7.5, PF, LF

## (undated) DEVICE — SUTURE, STRATAFIX, 1 SYMMETRIC, PDS PLUS, 60CM, CTX, VIOLET

## (undated) DEVICE — NEEDLE, HYPODERMIC, SPECIALTY, REGULAR WALL, SHORT BEVEL, 18 G X 1.5 IN

## (undated) DEVICE — SUTURE, VICRYL, 1, 27 IN, CT-1 CR, UNDYED

## (undated) DEVICE — SOLUTION, IRRIGATION, STERILE WATER, 1000 ML, POUR BOTTLE

## (undated) DEVICE — PIN, BONE, 4MM X 140MM, STERILE

## (undated) DEVICE — CHECKPOINT KIT, FEMORAL/ TIBIAL

## (undated) DEVICE — SYRINGE, 60 CC, LUER LOCK, MONOJECT, W/O CAP, LF

## (undated) DEVICE — TOWEL PACK, STERILE, 4/PACK, BLUE

## (undated) DEVICE — MARKER, SKIN, DUAL TIP, W/RULER AND LABEL

## (undated) DEVICE — SUTURE, MONOCRYL, 3-0, PS-1 27IN, UNDYED

## (undated) DEVICE — GOWN, SURGICAL, NON-REINFORCED, XLARGE, LF

## (undated) DEVICE — DRESSING, MEPILEX BORDER, POST-OP AG, 4 X 12 IN

## (undated) DEVICE — PAD, KNEE PATIENT, DEMAYO, DISP, STERILE

## (undated) DEVICE — DRAPE, INSTRUMENT, W/POUCH, STERI DRAPE, 7 X 11 IN, DISPOSABLE, STERILE

## (undated) DEVICE — DRAPE KIT, RIO

## (undated) DEVICE — HOOD, STERISHIELD T4 SYSTEM

## (undated) DEVICE — ADHESIVE, SKIN, DERMABOND ADVANCED, 15CM, PEN-STYLE

## (undated) DEVICE — BANDAGE, COMPRESSION, W/CLIP, FLEX-MASTER, DOUBLE LENGTH, 6 IN X 11 YD, LF

## (undated) DEVICE — TIP, SUCTION, YANKAUER, FLEXIBLE

## (undated) DEVICE — Device

## (undated) DEVICE — SUTURE, STRATAFIX, 3-0, SPIRAL MONOCRYL PLUS, PS, 70CM, UNDYED

## (undated) DEVICE — BLADE, STRYKER, OSC, 19 X 90 X 1.27G

## (undated) DEVICE — BLADE, MAKO, SAGITTAL, STANDARD

## (undated) DEVICE — MIXER, CEMENT, MIXEVAC III HIGH VACUUM KIT, STERILE

## (undated) DEVICE — GLOVE, SURGICAL, PROTEXIS PI BLUE W/NEUTHERA, 7.5, PF, LF

## (undated) DEVICE — SOLUTION, IRRIGATION, USP, SODIUM CHLORIDE 0.9%, 3000 ML, BAG